# Patient Record
Sex: MALE | Race: WHITE | URBAN - METROPOLITAN AREA
[De-identification: names, ages, dates, MRNs, and addresses within clinical notes are randomized per-mention and may not be internally consistent; named-entity substitution may affect disease eponyms.]

---

## 2017-02-10 ENCOUNTER — OFFICE VISIT (OUTPATIENT)
Dept: INTERNAL MEDICINE CLINIC | Age: 22
End: 2017-02-10

## 2017-02-10 VITALS
OXYGEN SATURATION: 99 % | HEART RATE: 97 BPM | HEIGHT: 70 IN | TEMPERATURE: 98.8 F | RESPIRATION RATE: 20 BRPM | BODY MASS INDEX: 33.79 KG/M2 | SYSTOLIC BLOOD PRESSURE: 128 MMHG | DIASTOLIC BLOOD PRESSURE: 78 MMHG | WEIGHT: 236 LBS

## 2017-02-10 DIAGNOSIS — F98.8 ADD (ATTENTION DEFICIT DISORDER): ICD-10-CM

## 2017-02-10 DIAGNOSIS — J02.0 STREP THROAT: Primary | ICD-10-CM

## 2017-02-10 DIAGNOSIS — R50.9 FEVER AND CHILLS: ICD-10-CM

## 2017-02-10 DIAGNOSIS — R19.7 DIARRHEA, UNSPECIFIED TYPE: ICD-10-CM

## 2017-02-10 DIAGNOSIS — R59.0 ANTERIOR CERVICAL LYMPHADENOPATHY: ICD-10-CM

## 2017-02-10 DIAGNOSIS — R11.2 NON-INTRACTABLE VOMITING WITH NAUSEA, UNSPECIFIED VOMITING TYPE: ICD-10-CM

## 2017-02-10 RX ORDER — DEXTROAMPHETAMINE SACCHARATE, AMPHETAMINE ASPARTATE, DEXTROAMPHETAMINE SULFATE AND AMPHETAMINE SULFATE 2.5; 2.5; 2.5; 2.5 MG/1; MG/1; MG/1; MG/1
10 TABLET ORAL 2 TIMES DAILY
Qty: 60 TAB | Refills: 0 | Status: SHIPPED | OUTPATIENT
Start: 2017-02-10 | End: 2017-06-22 | Stop reason: SDUPTHER

## 2017-02-10 RX ORDER — AMOXICILLIN 500 MG/1
500 CAPSULE ORAL 2 TIMES DAILY
Qty: 20 CAP | Refills: 0 | Status: SHIPPED | OUTPATIENT
Start: 2017-02-10 | End: 2017-11-27 | Stop reason: ALTCHOICE

## 2017-02-10 RX ORDER — DEXTROAMPHETAMINE SACCHARATE, AMPHETAMINE ASPARTATE, DEXTROAMPHETAMINE SULFATE AND AMPHETAMINE SULFATE 2.5; 2.5; 2.5; 2.5 MG/1; MG/1; MG/1; MG/1
10 TABLET ORAL 2 TIMES DAILY
COMMUNITY
End: 2017-02-10 | Stop reason: SDUPTHER

## 2017-02-10 NOTE — PROGRESS NOTES
Chief Complaint   Patient presents with    Nausea     started wed,  vomited all yesterday, has been able to hold down fluid with a small amount of food last night.  Diarrhea     has subsidded today.  Headache     vague head pain along with gen weakness and pain. started wed.  Other     stated has \"bone pain\"     1. Have you been to the ER, urgent care clinic since your last visit? Hospitalized since your last visit? No    2. Have you seen or consulted any other health care providers outside of the [facility] since your last visit? Include any pap smears or colon screening.  No

## 2017-02-10 NOTE — MR AVS SNAPSHOT
Visit Information Date & Time Provider Department Dept. Phone Encounter #  
 2/10/2017  1:40 PM Tanya Bliss Internal Medicine [phone] [id number] Upcoming Health Maintenance Date Due DTaP/Tdap/Td series (1 - Tdap) 12/27/2014 INFLUENZA AGE 9 TO ADULT 8/1/2016 Allergies as of 2/10/2017  Review Complete On: 2/10/2017 By: Santana Epley, LPN No Known Allergies Current Immunizations  Never Reviewed No immunizations on file. Not reviewed this visit You Were Diagnosed With   
  
 Codes Comments Strep throat    -  Primary ICD-10-CM: J02.0 ICD-9-CM: 034.0 Fever and chills     ICD-10-CM: R50.9 ICD-9-CM: 780.60 Non-intractable vomiting with nausea, unspecified vomiting type     ICD-10-CM: R11.2 ICD-9-CM: 787.01 Diarrhea, unspecified type     ICD-10-CM: R19.7 ICD-9-CM: 787.91 Anterior cervical lymphadenopathy     ICD-10-CM: R59.0 ICD-9-CM: 785.6 ADD (attention deficit disorder)     ICD-10-CM: F98.8 ICD-9-CM: 314.00 Vitals BP Pulse Temp Resp Height(growth percentile) Weight(growth percentile) 128/78 97 98.8 °F (37.1 °C) (Oral) 20 5' 10\" (1.778 m) 236 lb (107 kg) SpO2 BMI Smoking Status 99% 33.86 kg/m2 Never Smoker BMI and BSA Data Body Mass Index Body Surface Area  
 33.86 kg/m 2 2.3 m 2 Preferred Pharmacy Pharmacy Name Phone [id number] [place] [address] [phone] Your Updated Medication List  
  
   
This list is accurate as of: 2/10/17 11:59 PM.  Always use your most recent med list.  
  
  
  
  
 AFRIN (OXYMETAZOLINE) 0.05 % nasal spray Generic drug:  oxymetazoline 2 Sprays two (2) times a day. albuterol 90 mcg/actuation inhaler Commonly known as:  PROVENTIL HFA, VENTOLIN HFA, PROAIR HFA Take 1 Puff by inhalation every four (4) hours as needed for Wheezing or Shortness of Breath. amoxicillin 500 mg capsule Commonly known as:  AMOXIL Take 1 Cap by mouth two (2) times a day. chlorpheniramine-HYDROcodone 10-8 mg/5 mL suspension Commonly known as:  Mary Favorite Take 5 mL by mouth every twelve (12) hours as needed for Cough. Max Daily Amount: 10 mL. dextroamphetamine-amphetamine 10 mg tablet Commonly known as:  ADDERALL Take 1 Tab (10 mg total) by mouth two (2) times a day. Max Daily Amount: 20 mg  
  
 fluticasone 50 mcg/actuation nasal spray Commonly known as:  Caffie Euler 2 Sprays by Both Nostrils route daily. naproxen 375 mg tablet Commonly known as:  NAPROSYN Take 1 Tab by mouth as needed. ondansetron hcl 4 mg tablet Commonly known as:  Glorianne Ou Take 1 Tab by mouth every eight (8) hours as needed for Nausea. SUMAtriptan 50 mg tablet Commonly known as:  IMITREX Take 1 Tab by mouth once as needed for Migraine for up to 1 dose. topiramate 50 mg tablet Commonly known as:  TOPAMAX Take 1 Tab by mouth two (2) times a day. Prescriptions Printed Refills  
 dextroamphetamine-amphetamine (ADDERALL) 10 mg tablet 0 Sig: Take 1 Tab (10 mg total) by mouth two (2) times a day. Max Daily Amount: 20 mg  
 Class: Print Route: Oral  
  
Prescriptions Sent to Pharmacy Refills  
 amoxicillin (AMOXIL) 500 mg capsule 0 Sig: Take 1 Cap by mouth two (2) times a day. Class: Normal  
 Pharmacy: [facility]/pharmacy #[id number] [address] #: [phone] Route: Oral  
  
Introducing [facility]! Laurie Phillips introduces [email or web address] patient portal. Now you can access parts of your medical record, email your doctor's office, and request medication refills online. 1. In your internet browser, go to [email or web address] 2. Click on the First Time User? Click Here link in the Sign In box. You will see the New Member Sign Up page. 3. Enter your FlightCar Access Code exactly as it appears below. You will not need to use this code after youve completed the sign-up process. If you do not sign up before the expiration date, you must request a new code. · FlightCar Access Code: [email or web address] Expires: 5/13/2017  9:22 AM 
 
4. Enter the last four digits of your Social Security Number (xxxx) and Date of Birth (mm/dd/yyyy) as indicated and click Submit. You will be taken to the next sign-up page. 5. Create a FlightCar ID. This will be your FlightCar login ID and cannot be changed, so think of one that is secure and easy to remember. 6. Create a FlightCar password. You can change your password at any time. 7. Enter your Password Reset Question and Answer. This can be used at a later time if you forget your password. 8. Enter your e-mail address. You will receive e-mail notification when new information is available in [address] 9. Click Sign Up. You can now view and download portions of your medical record. 10. Click the Download Summary menu link to download a portable copy of your medical information. If you have questions, please visit the Frequently Asked Questions section of the FlightCar website. Remember, FlightCar is NOT to be used for urgent needs. For medical emergencies, dial 911. Now available from your iPhone and Android! Please provide this summary of care documentation to your next provider. Your primary care clinician is listed as Raj De Jesus. If you have any questions after today's visit, please call [phone].

## 2017-02-12 NOTE — PROGRESS NOTES
HISTORY OF PRESENT ILLNESS  Jose Carlos Victoria is a 19 y.o. male here with cold symptoms/GI symptoms. 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 60 Tab 3    ondansetron hcl (ZOFRAN) 4 mg tablet Take 1 Tab by mouth every eight (8) hours as needed for Nausea. 30 Tab 2    SUMAtriptan (IMITREX) 50 mg tablet Take 1 Tab by mouth once as needed for Migraine for up to 1 dose. 30 Tab 2    naproxen (NAPROSYN) 375 mg tablet Take 1 Tab by mouth as needed. 60 Tab 2    fluticasone (FLONASE) 50 mcg/actuation nasal spray 2 Sprays by Both Nostrils route daily. 1 Bottle 0    chlorpheniramine-HYDROcodone (TUSSIONEX) 8-10 mg/5 mL suspension Take 5 mL by mouth every twelve (12) hours as needed for Cough. Max Daily Amount: 10 mL. 200 mL 0    albuterol (PROVENTIL HFA, VENTOLIN HFA, PROAIR HFA) 90 mcg/actuation inhaler Take 1 Puff by inhalation every four (4) hours as needed for Wheezing or Shortness of Breath. 1 Inhaler 0    oxymetazoline (AFRIN) 0.05 % nasal spray 2 Sprays two (2) times a day. No current facility-administered medications on file prior to visit. Past Medical History   Diagnosis Date    Headache     Headache(784.0)      History reviewed. No pertinent past surgical history. Social History     Social History    Marital status: SINGLE     Spouse name: N/A    Number of children: N/A    Years of education: N/A     Occupational History    Not on file.      Social History Main Topics    Smoking status: Never Smoker    Smokeless tobacco: Never Used    Alcohol use No    Drug use: No    Sexual activity: Not on file     Other Topics Concern    Not on file     Social History Narrative     Family History   Problem Relation Age of Onset    Diabetes Mother     No Known Problems Father     Diabetes Maternal Grandmother     Cancer Maternal Grandfather      This note will not be viewable in MyChart. If Narcotics or controlled substances were prescribed, I personally reviewed the patient  history. AKIL Sullivan is a 19 y.o. male who is here to talk about: dry cough, myalgias, headache, fever, chills and GI symptoms for 3 days ago, intermittently worsening since that time. He also reports nausea, vomiting and diarrhea which have resolved. He also has noted a \"scratchy\" throat. He denies a history of productive cough, generalized sinus pain, neither ear pain, white spots in throat, rhinorrhea, wheezing and SOB/ABRAHAM. Evaluation to date: none. Treatment to date: OTC products, which have been ineffective. Relevant PMH: No pertinent additional PMH. 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 Review of Systems   Constitutional: Positive for chills, fever and malaise/fatigue. HENT: Positive for congestion and sore throat (\"scratcy\"). Negative for ear pain. Eyes: Negative for discharge and redness. Respiratory: Positive for cough. Negative for sputum production, shortness of breath and wheezing. Cardiovascular: Negative for chest pain and palpitations. Gastrointestinal: Positive for abdominal pain, diarrhea, nausea and vomiting. Negative for constipation. GI symptoms have improved   Musculoskeletal: Positive for myalgias. Negative for back pain, joint pain and neck pain. Neurological: Negative for dizziness, tingling and headaches. Physical Exam   Constitutional: He is oriented to person, place, and time. Vital signs are normal. He appears well-developed. He is cooperative. No distress. Patient obese. HENT:   Right Ear: Tympanic membrane, external ear and ear canal normal. Tympanic membrane is not erythematous. No middle ear effusion. Left Ear: Tympanic membrane, external ear and ear canal normal. Tympanic membrane is not erythematous. No middle ear effusion. Nose: Nose normal. No mucosal edema or rhinorrhea. Right sinus exhibits no maxillary sinus tenderness and no frontal sinus tenderness. Left sinus exhibits no maxillary sinus tenderness and no frontal sinus tenderness. Mouth/Throat: Uvula is midline and mucous membranes are normal. Mucous membranes are not dry. Posterior oropharyngeal edema and posterior oropharyngeal erythema present. No oropharyngeal exudate or tonsillar abscesses. Eyes: Conjunctivae and EOM are normal. Pupils are equal, round, and reactive to light. Right eye exhibits no discharge. Left eye exhibits no discharge. Neck: Normal range of motion. Neck supple. Cardiovascular: Normal rate, regular rhythm and normal heart sounds. Pulmonary/Chest: Effort normal and breath sounds normal. No respiratory distress. Abdominal: Soft. Bowel sounds are normal. He exhibits no distension. There is no tenderness. There is no rigidity and no guarding. Lymphadenopathy:     He has cervical adenopathy. Neurological: He is alert and oriented to person, place, and time. Skin: Skin is warm and dry. He is not diaphoretic. Nursing note and vitals reviewed. ASSESSMENT and PLAN    ICD-10-CM ICD-9-CM    1. Strep throat J02.0 034.0 amoxicillin (AMOXIL) 500 mg capsule   2. Fever and chills R50.9 780.60    3. Non-intractable vomiting with nausea, unspecified vomiting type R11.2 787.01    4. Diarrhea, unspecified type R19.7 787.91    5. Anterior cervical lymphadenopathy R59.0 785.6    6. ADD (attention deficit disorder) F98.8 314.00 dextroamphetamine-amphetamine (ADDERALL) 10 mg tablet   STREP POSITIVE. Influenza negative. Amoxicillin prescribed.  Medication benefits, risks, indication, dosage, potential side effects and alternate medication options were discussed with patient who expressed understanding. Encouraged rest, increased fluids, Tylenol/Ibuprofen for pain/fever and warm salt-water gargles as tolerated. Return to office if no improvement in 4-5 days. Patient expressed understanding of instructions and agreement with treatment plan. Provided refill of Adderall as well. Medication benefits, risks, indication, dosage, potential side effects and alternate medication options were discussed with patient who expressed understanding. Disclaimer:  Advised patient to call back or return to office if symptoms worsen/change/persist.  Discussed expected course/resolution/complications of diagnosis in detail with patient.     Medication risks/benefits/costs/interactions/alternatives discussed with patient. Patient was provided an after visit summary which includes diagnoses, current medications, & vitals. Patient expressed understanding with the diagnosis and plan.

## 2017-02-20 ENCOUNTER — OFFICE VISIT (OUTPATIENT)
Dept: INTERNAL MEDICINE CLINIC | Age: 22
End: 2017-02-20

## 2017-02-20 VITALS
WEIGHT: 245.4 LBS | OXYGEN SATURATION: 98 % | RESPIRATION RATE: 16 BRPM | HEIGHT: 70 IN | BODY MASS INDEX: 35.13 KG/M2 | HEART RATE: 83 BPM | DIASTOLIC BLOOD PRESSURE: 72 MMHG | TEMPERATURE: 98.6 F | SYSTOLIC BLOOD PRESSURE: 98 MMHG

## 2017-02-20 DIAGNOSIS — R50.9 FEVER AND CHILLS: ICD-10-CM

## 2017-02-20 DIAGNOSIS — M79.10 MYALGIA: ICD-10-CM

## 2017-02-20 DIAGNOSIS — R59.0 ANTERIOR CERVICAL LYMPHADENOPATHY: ICD-10-CM

## 2017-02-20 DIAGNOSIS — J02.9 SORE THROAT: ICD-10-CM

## 2017-02-20 DIAGNOSIS — R05.9 COUGH: ICD-10-CM

## 2017-02-20 DIAGNOSIS — J02.0 STREP THROAT: Primary | ICD-10-CM

## 2017-02-20 LAB
S PYO AG THROAT QL: POSITIVE
VALID INTERNAL CONTROL?: YES

## 2017-02-20 RX ORDER — CLINDAMYCIN HYDROCHLORIDE 300 MG/1
300 CAPSULE ORAL 3 TIMES DAILY
Qty: 30 CAP | Refills: 0 | Status: SHIPPED | OUTPATIENT
Start: 2017-02-20 | End: 2017-03-02

## 2017-02-20 RX ORDER — CODEINE PHOSPHATE AND GUAIFENESIN 10; 100 MG/5ML; MG/5ML
5 SOLUTION ORAL
Qty: 118 ML | Refills: 0 | Status: SHIPPED | OUTPATIENT
Start: 2017-02-20 | End: 2017-11-27 | Stop reason: ALTCHOICE

## 2017-02-20 NOTE — MR AVS SNAPSHOT
Visit Information Date & Time Provider Department Dept. Phone Encounter #  
 2/20/2017  3:00 PM Anuja Chapman [address] Internal Medicine [phone] [id number] Upcoming Health Maintenance Date Due DTaP/Tdap/Td series (1 - Tdap) 12/27/2014 INFLUENZA AGE 9 TO ADULT 8/1/2016 Allergies as of 2/20/2017  Review Complete On: 2/20/2017 By: Anuja Chapman NP No Known Allergies Current Immunizations  Never Reviewed No immunizations on file. Not reviewed this visit You Were Diagnosed With   
  
 Codes Comments Strep throat    -  Primary ICD-10-CM: J02.0 ICD-9-CM: 034.0 Fever and chills     ICD-10-CM: R50.9 ICD-9-CM: 780.60 Myalgia     ICD-10-CM: M79.1 ICD-9-CM: 729.1 Anterior cervical lymphadenopathy     ICD-10-CM: R59.0 ICD-9-CM: 785.6 Sore throat     ICD-10-CM: J02.9 ICD-9-CM: 516 Cough     ICD-10-CM: R05 ICD-9-CM: 794. 2 Vitals BP Pulse Temp Resp Height(growth percentile) Weight(growth percentile) 98/72 (BP 1 Location: Left arm, BP Patient Position: Sitting) 83 98.6 °F (37 °C) (Oral) 16 5' 10\" (1.778 m) 245 lb 6.4 oz (111.3 kg) SpO2 BMI Smoking Status 98% 35.21 kg/m2 Never Smoker BMI and BSA Data Body Mass Index Body Surface Area  
 35.21 kg/m 2 2.34 m 2 Preferred Pharmacy Pharmacy Name Phone [id number] [address] [phone] Your Updated Medication List  
  
   
This list is accurate as of: 2/20/17  3:55 PM.  Always use your most recent med list.  
  
  
  
  
 AFRIN (OXYMETAZOLINE) 0.05 % nasal spray Generic drug:  oxymetazoline 2 Sprays two (2) times a day. albuterol 90 mcg/actuation inhaler Commonly known as:  PROVENTIL HFA, VENTOLIN HFA, PROAIR HFA Take 1 Puff by inhalation every four (4) hours as needed for Wheezing or Shortness of Breath. amoxicillin 500 mg capsule Commonly known as:  AMOXIL Take 1 Cap by mouth two (2) times a day. clindamycin 300 mg capsule Commonly known as:  CLEOCIN Take 1 Cap by mouth three (3) times daily for 10 days. dextroamphetamine-amphetamine 10 mg tablet Commonly known as:  ADDERALL Take 1 Tab (10 mg total) by mouth two (2) times a day. Max Daily Amount: 20 mg  
  
 fluticasone 50 mcg/actuation nasal spray Commonly known as:  Fernando Sacks 2 Sprays by Both Nostrils route daily. guaiFENesin-codeine 100-10 mg/5 mL solution Commonly known as:  ROBITUSSIN AC Take 5 mL by mouth three (3) times daily as needed for Cough. Max Daily Amount: 15 mL.  
  
 naproxen 375 mg tablet Commonly known as:  NAPROSYN Take 1 Tab by mouth as needed. ondansetron hcl 4 mg tablet Commonly known as:  Jud Footman Take 1 Tab by mouth every eight (8) hours as needed for Nausea. SUMAtriptan 50 mg tablet Commonly known as:  IMITREX Take 1 Tab by mouth once as needed for Migraine for up to 1 dose. topiramate 50 mg tablet Commonly known as:  TOPAMAX Take 1 Tab by mouth two (2) times a day. Prescriptions Printed Refills  
 guaiFENesin-codeine (ROBITUSSIN AC) 100-10 mg/5 mL solution 0 Sig: Take 5 mL by mouth three (3) times daily as needed for Cough. Max Daily Amount: 15 mL. Class: Print Route: Oral  
  
Prescriptions Sent to Pharmacy Refills  
 clindamycin (CLEOCIN) 300 mg capsule 0 Sig: Take 1 Cap by mouth three (3) times daily for 10 days. Class: Normal  
 Pharmacy: [facility]/pharmacy #[id number]- [place], [address] [place] [address] #: [phone] Route: Oral  
  
We Performed the Following AMB POC RAPID INFLUENZA TEST [65575 CPT(R)] AMB POC RAPID STREP A [79586 CPT(R)] UPPER RESPIRATORY CULTURE [id number] CPT(R)] Introducing [facility]!    
 [facility] introduces 2CRisk patient portal. Now you can access parts of your medical record, email your doctor's office, and request medication refills online. 1. In your internet browser, go to [email or web address] "[email or web address] 2. Click on the First Time User? Click Here link in the Sign In box. You will see the New Member Sign Up page. 3. Enter your PlotWatt Access Code exactly as it appears below. You will not need to use this code after youve completed the sign-up process. If you do not sign up before the expiration date, you must request a new code. · PlotWatt Access Code: [email or web address] Expires: 5/13/2017  9:22 AM 
 
4. Enter the last four digits of your Social Security Number (xxxx) and Date of Birth (mm/dd/yyyy) as indicated and click Submit. You will be taken to the next sign-up page. 5. Create a PlotWatt ID. This will be your PlotWatt login ID and cannot be changed, so think of one that is secure and easy to remember. 6. Create a PlotWatt password. You can change your password at any time. 7. Enter your Password Reset Question and Answer. This can be used at a later time if you forget your password. 8. Enter your e-mail address. You will receive e-mail notification when new information is available in [address] 9. Click Sign Up. You can now view and download portions of your medical record. 10. Click the Download Summary menu link to download a portable copy of your medical information. If you have questions, please visit the Frequently Asked Questions section of the PlotWatt website. Remember, PlotWatt is NOT to be used for urgent needs. For medical emergencies, dial 911. Now available from your iPhone and Android! Please provide this summary of care documentation to your next provider. Your primary care clinician is listed as Sarthak Miranda. If you have any questions after today's visit, please call [phone].

## 2017-02-20 NOTE — LETTER
NOTIFICATION RETURN TO WORK / SCHOOL 
 
2/20/2017 3:39 PM 
 
Mr. Kimberly Esquivel [address] [facility] [address] To Whom It May Concern: 
 
Kimberly Esquivel is currently under the care of [address] [place] [address]. He will return to work/school on: Thursday February 23, 2017 If there are questions or concerns please have the patient contact our office.  
 
 
 
Sincerely, 
 
 
Jas Frost NP

## 2017-02-20 NOTE — PROGRESS NOTES
Chief Complaint   Patient presents with    Cough     sore throat, and states that he didn't have strep last week but now he has a very sore throat.  saturday is when it started getting worse.  cold

## 2017-02-20 NOTE — PROGRESS NOTES
HISTORY OF PRESENT ILLNESS  Jose Carlos Victoria is a 19 y.o. male here with cough/sore throat. 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 20 Cap 0    dextroamphetamine-amphetamine (ADDERALL) 10 mg tablet Take 1 Tab (10 mg total) by mouth two (2) times a day. Max Daily Amount: 20 mg 60 Tab 0    topiramate (TOPAMAX) 50 mg tablet Take 1 Tab by mouth two (2) times a day. 60 Tab 3    ondansetron hcl (ZOFRAN) 4 mg tablet Take 1 Tab by mouth every eight (8) hours as needed for Nausea. 30 Tab 2    SUMAtriptan (IMITREX) 50 mg tablet Take 1 Tab by mouth once as needed for Migraine for up to 1 dose. 30 Tab 2    naproxen (NAPROSYN) 375 mg tablet Take 1 Tab by mouth as needed. 60 Tab 2    fluticasone (FLONASE) 50 mcg/actuation nasal spray 2 Sprays by Both Nostrils route daily. 1 Bottle 0    chlorpheniramine-HYDROcodone (TUSSIONEX) 8-10 mg/5 mL suspension Take 5 mL by mouth every twelve (12) hours as needed for Cough. Max Daily Amount: 10 mL. 200 mL 0    albuterol (PROVENTIL HFA, VENTOLIN HFA, PROAIR HFA) 90 mcg/actuation inhaler Take 1 Puff by inhalation every four (4) hours as needed for Wheezing or Shortness of Breath. 1 Inhaler 0    oxymetazoline (AFRIN) 0.05 % nasal spray 2 Sprays two (2) times a day. No current facility-administered medications on file prior to visit. Past Medical History   Diagnosis Date    Headache     Headache(784.0)      No past surgical history on file.   Social History     Social History    Marital status: SINGLE     Spouse name: N/A    Number of children: N/A    Years of education: N/A     Occupational History    Not on file. Social History Main Topics    Smoking status: Never Smoker    Smokeless tobacco: Never Used    Alcohol use No    Drug use: No    Sexual activity: Not on file     Other Topics Concern    Not on file     Social History Narrative     Family History   Problem Relation Age of Onset    Diabetes Mother     No Known Problems Father     Diabetes Maternal Grandmother     Cancer Maternal Grandfather      This note will not be viewable in [employer]. If Narcotics or controlled substances were prescribed, I personally reviewed the patient  history. AKIL José is a 19 y.o. male with history of STREP + who is here to talk about: sore throat, swollen glands, dry cough, myalgias, fever, chills and URI symptoms for 2 days ago, gradually worsening since that time. He also reports diagnosis of strep 2 weeks ago treated with Amoxicillin which he completed. He denies a history of generalized sinus pain, nausea/vomiting/diarrhea, rhinorrhea, wheezing and SOB/ABRAHAM. Evaluation to date: previously treated for +strep with Amoxicillin . Treatment to date: OTC products, which have been temporarily effective. Relevant PMH: No pertinent additional PMH. 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 Review of Systems   Constitutional: Positive for chills, fever and malaise/fatigue. HENT: Positive for congestion and sore throat. Negative for ear pain. Eyes: Negative for discharge and redness. Respiratory: Positive for cough. Negative for sputum production, shortness of breath and wheezing. Cardiovascular: Negative for chest pain and palpitations. Gastrointestinal: Negative for abdominal pain, nausea and vomiting. Musculoskeletal: Positive for myalgias. Neurological: Negative for dizziness, tingling and headaches.        Physical Exam   Constitutional: He is oriented to person, place, and time. He appears well-developed. He is cooperative. Non-toxic appearance. He does not have a sickly appearance. He appears ill. No distress. Patient hypotensive without symptoms. Patient obese. HENT:   Right Ear: Tympanic membrane, external ear and ear canal normal. Tympanic membrane is not erythematous. No middle ear effusion. Left Ear: Tympanic membrane, external ear and ear canal normal. Tympanic membrane is not erythematous. No middle ear effusion. Nose: Nose normal. No mucosal edema or rhinorrhea. Right sinus exhibits no maxillary sinus tenderness and no frontal sinus tenderness. Left sinus exhibits no maxillary sinus tenderness and no frontal sinus tenderness. Mouth/Throat: Uvula is midline and mucous membranes are normal. Mucous membranes are not dry. Posterior oropharyngeal edema and posterior oropharyngeal erythema present. No oropharyngeal exudate or tonsillar abscesses. Eyes: Conjunctivae and EOM are normal. Pupils are equal, round, and reactive to light. Right eye exhibits no discharge. Left eye exhibits no discharge. Neck: Normal range of motion. Neck supple. Cardiovascular: Normal rate, regular rhythm and normal heart sounds. Pulmonary/Chest: Effort normal and breath sounds normal. No respiratory distress. Lymphadenopathy:     He has cervical adenopathy. Neurological: He is alert and oriented to person, place, and time. Skin: Skin is warm and dry. He is not diaphoretic. Nursing note and vitals reviewed. ASSESSMENT and PLAN    ICD-10-CM ICD-9-CM    1. Strep throat J02.0 034.0 UPPER RESPIRATORY CULTURE      clindamycin (CLEOCIN) 300 mg capsule   2. Fever and chills R50.9 780.60 AMB POC RAPID STREP A      AMB POC RAPID INFLUENZA TEST      UPPER RESPIRATORY CULTURE   3. Myalgia M79.1 729.1 AMB POC RAPID STREP A      AMB POC RAPID INFLUENZA TEST      UPPER RESPIRATORY CULTURE   4.  Anterior cervical lymphadenopathy R59.0 785.6 AMB POC RAPID STREP A      AMB POC RAPID INFLUENZA TEST      UPPER RESPIRATORY CULTURE   5. Sore throat J02.9 462 AMB POC RAPID STREP A      AMB POC RAPID INFLUENZA TEST      UPPER RESPIRATORY CULTURE   6. Cough R05 786.2 AMB POC RAPID STREP A      AMB POC RAPID INFLUENZA TEST      UPPER RESPIRATORY CULTURE      guaiFENesin-codeine (ROBITUSSIN AC) 100-10 mg/5 mL solution   STREP POSITIVE. Influenza negative. Clindamycin and Robitussin AC prescribed. Medication benefits, risks, indication, dosage, potential side effects and alternate medication options were discussed with patient who expressed understanding. Encouraged rest, increased fluids, Tylenol/Ibuprofen for pain/fever and warm salt-water gargles as tolerated. Upper respiratory culture obtained. Will notify patient of results and adjust treatment plan as indicated. Return to office if no improvement in 4-5 days. Note provided to return to work in 3 days. Encouraged probiotics BID with this medication. Patient expressed understanding of instructions and agreement with treatment plan. Disclaimer:  Advised patient to call back or return to office if symptoms worsen/change/persist.  Discussed expected course/resolution/complications of diagnosis in detail with patient.     Medication risks/benefits/costs/interactions/alternatives discussed with patient. Patient was provided an after visit summary which includes diagnoses, current medications, & vitals. Patient expressed understanding with the diagnosis and plan.

## 2017-02-20 NOTE — PATIENT INSTRUCTIONS
Thank you for choosing [address] [facility]. We know you have many options when it comes to your healthcare; we appreciate that you picked us. Our goal is to provide exceptional  service and world class care for every patient. You may receive a survey in the mail or by email asking for your feedback. Please take a few minutes to share your thoughts about your recent visit. Your comments helps us understand what we do well and what we can do better. To ensure confidentiality, this survey is administered by an independent third-party, [address] [place] [address] participation will help us to improve the quality of care that we provide to you, your family, friends, and neighbors. Thank you! Cough: Care Instructions  Your Care Instructions  A cough is your body's response to something that bothers your throat or airways. Many things can cause a cough. You might cough because of a cold or the flu, bronchitis, or asthma. Smoking, postnasal drip, allergies, and stomach acid that backs up into your throat also can cause coughs. A cough is a symptom, not a disease. Most coughs stop when the cause, such as a cold, goes away. You can take a few steps at home to cough less and feel better. Follow-up care is a key part of your treatment and safety. Be sure to make and go to all appointments, and call your doctor if you are having problems. It's also a good idea to know your test results and keep a list of the medicines you take. How can you care for yourself at home? · Drink lots of water and other fluids. This helps thin the mucus and soothes a dry or sore throat. Honey or lemon juice in hot water or tea may ease a dry cough. · Take cough medicine as directed by your doctor. · Prop up your head on pillows to help you breathe and ease a dry cough. · Try cough drops to soothe a dry or sore throat. Cough drops don't stop a cough.  Medicine-flavored cough drops are no better than candy-flavored drops or hard candy. · Do not smoke. Avoid secondhand smoke. If you need help quitting, talk to your doctor about stop-smoking programs and medicines. These can increase your chances of quitting for good. When should you call for help? Call 911 anytime you think you may need emergency care. For example, call if:  · You have severe trouble breathing. Call your doctor now or seek immediate medical care if:  · You cough up blood. · You have new or worse trouble breathing. · You have a new or higher fever. · You have a new rash. Watch closely for changes in your health, and be sure to contact your doctor if:  · You cough more deeply or more often, especially if you notice more mucus or a change in the color of your mucus. · You have new symptoms, such as a sore throat, an earache, or sinus pain. · You do not get better as expected. Where can you learn more? Go to http://saida-huy.info/. Enter D279 in the search box to learn more about \"Cough: Care Instructions. \"  Current as of: May 27, 2016  Content Version: 11.1  © 1552-0648 [employer]. Care instructions adapted under license by [employer] (which disclaims liability or warranty for this information). If you have questions about a medical condition or this instruction, always ask your healthcare professional. [address] any warranty or liability for your use of this information. Learning About Fever  What is a fever? A fever is a high body temperature. It's one way your body fights being sick. A fever shows that the body is responding to infection or other illnesses, both minor and severe. A fever is a symptom, not an illness by itself. A fever can be a sign that you are ill, but most fevers are not caused by a serious problem. You may have a fever with a minor illness, such as a cold. But sometimes a very serious infection may cause little or no fever.  It is important to look at other symptoms, other conditions you have, and how you feel in general. In children, notice how they act and see what symptoms they complain of. What is a normal body temperature? A normal body temperature is about 98. 6ºF. Some people have a normal temperature that is a little higher or a little lower than this. Your temperature may be a little lower in the morning than it is later in the day. It may go up during hot weather or when you exercise, wear heavy clothes, or take a hot bath. Your temperature may also be different depending on how you take it. A temperature taken in the mouth (oral) or under the arm may be a little lower than your core temperature (rectal). What is a fever temperature? A core temperature of 100.4°F or above is considered a fever. What can cause a fever? A fever may be caused by:  · Infections. This is the most common cause of a fever. Examples of infections that can cause a fever include the flu, a kidney infection, or pneumonia. · Some medicines. · Severe trauma or injury, such as a heart attack, stroke, heatstroke, or burns. · Other medical conditions, such as arthritis and some cancers. How can you treat a fever at home? · Ask your doctor if you can take an over-the-counter pain medicine, such as acetaminophen (Tylenol), ibuprofen (Advil, Motrin), or naproxen (Aleve). Be safe with medicines. Read and follow all instructions on the label. · To prevent dehydration, drink plenty of fluids. Choose water and other caffeine-free clear liquids until you feel better. If you have kidney, heart, or liver disease and have to limit fluids, talk with your doctor before you increase the amount of fluids you drink. Follow-up care is a key part of your treatment and safety. Be sure to make and go to all appointments, and call your doctor if you are having problems. It's also a good idea to know your test results and keep a list of the medicines you take.   Where can you learn more?  Go to http://saida-huy.info/. Enter D533 in the search box to learn more about \"Learning About Fever. \"  Current as of: May 27, 2016  Content Version: 11.1  © 8801-0841 [employer] Care instructions adapted under license by [employer] (which disclaims liability or warranty for this information). If you have questions about a medical condition or this instruction, always ask your healthcare professional. [address] any warranty or liability for your use of this information. Swollen Lymph Nodes: Care Instructions  Your Care Instructions  Lymph nodes are small, bean-shaped glands throughout the body. They help your body fight germs and infections. Lymph nodes often swell when there is a problem such as an injury, infection, or tumor. · The nodes in your neck, under your chin, or behind your ears may swell when you have a cold or sore throat. · An injury or infection in a leg or foot can make the nodes in your groin swell. · Sometimes medicine can make lymph nodes swell, but this is rare. Treatment depends on what caused your nodes to swell. Usually the nodes return to normal size without a problem. Follow-up care is a key part of your treatment and safety. Be sure to make and go to all appointments, and call your doctor if you are having problems. Its also a good idea to know your test results and keep a list of the medicines you take. How can you care for yourself at home? · Take your medicines exactly as prescribed. Call your doctor if you think you are having a problem with your medicine. · Avoid irritation. ¨ Do not squeeze or pick at the lump. ¨ Do not stick a needle in it. · Prevent infection. Do not squeeze, drain, or puncture a painful lump. Doing this can irritate or inflame the lump, push any existing infection deeper into the skin, or cause severe bleeding.   · Get extra rest. Slow down just a little from your usual routine. · Drink plenty of fluids, enough so that your urine is light yellow or clear like water. If you have kidney, heart, or liver disease and have to limit fluids, talk with your doctor before you increase the amount of fluids you drink. · Take an over-the-counter pain medicine, such as acetaminophen (Tylenol), ibuprofen (Advil, Motrin), or naproxen (Aleve). Read and follow all instructions on the label. · Do not take two or more pain medicines at the same time unless the doctor told you to. Many pain medicines have acetaminophen, which is Tylenol. Too much acetaminophen (Tylenol) can be harmful. When should you call for help? Watch closely for changes in your health, and be sure to contact your doctor if:  · Your lymph nodes do not get smaller, or they get bigger. · The area becomes red and feels tender. · The nodes feel hard and do not move when you push on them. · You have a fever of 100° F.  · You have night sweats. · You lose weight without trying. Where can you learn more? Go to http://saida9Lenseshuy.info/. Enter Z381 in the search box to learn more about \"Swollen Lymph Nodes: Care Instructions. \"  Current as of: May 24, 2016  Content Version: 11.1  © 9756-5575 [employer]. Care instructions adapted under license by [employer] (which disclaims liability or warranty for this information). If you have questions about a medical condition or this instruction, always ask your healthcare professional. [place] [address] any warranty or liability for your use of this information. Muscle Aches: Care Instructions  Your Care Instructions  Muscle aches have many possible causes. Some common ones are overuse, tension, and injuries such as a strained muscle. An infection such as the flu can cause muscle aches. Or the aches may be caused by some medicines, such as statins.  Muscle aches may also be a symptom of a disease like lupus or fibromyalgia. Myalgia is the medical term for muscle aches. The doctor will do a physical exam and ask questions to try to find what is causing your pain. You may also have tests such as blood tests or imaging tests like X-rays. These can help find or rule out serious problems. The doctor has checked you carefully, but problems can develop later. If you notice any problems or new symptoms, get medical treatment right away. Follow-up care is a key part of your treatment and safety. Be sure to make and go to all appointments, and call your doctor if you are having problems. It's also a good idea to know your test results and keep a list of the medicines you take. How can you care for yourself at home? · Rest the area that hurts. You may need to stop or reduce the activity that causes your symptoms. Then you can return to it slowly. · Put ice or a cold pack on the area for 10 to 20 minutes at a time to ease pain. Put a thin cloth between the ice and your skin. · Take an over-the-counter pain medicine, such as acetaminophen (Tylenol), ibuprofen (Advil, Motrin), or naproxen (Aleve). Be safe with medicines. Read and follow all instructions on the label. When should you call for help? Call your doctor now or seek immediate medical care if:  · Your pain gets worse. · You have new symptoms, such as a fever, swelling, or a rash. Watch closely for changes in your health, and be sure to contact your doctor if:  · You do not get better as expected. Where can you learn more? Go to http://saida-huy.info/. Enter G355 in the search box to learn more about \"Muscle Aches: Care Instructions. \"  Current as of: February 19, 2016  Content Version: 11.1  © 8903-3993 [employer]. Care instructions adapted under license by [employer] (which disclaims liability or warranty for this information).  If you have questions about a medical condition or this instruction, always ask your healthcare professional. [place] [address] any warranty or liability for your use of this information. Sore Throat: Care Instructions  Your Care Instructions    Infection by bacteria or a virus causes most sore throats. Cigarette smoke, dry air, air pollution, allergies, and yelling can also cause a sore throat. Sore throats can be painful and annoying. Fortunately, most sore throats go away on their own. If you have a bacterial infection, your doctor may prescribe antibiotics. Follow-up care is a key part of your treatment and safety. Be sure to make and go to all appointments, and call your doctor if you are having problems. It's also a good idea to know your test results and keep a list of the medicines you take. How can you care for yourself at home? · If your doctor prescribed antibiotics, take them as directed. Do not stop taking them just because you feel better. You need to take the full course of antibiotics. · Gargle with warm salt water once an hour to help reduce swelling and relieve discomfort. Use 1 teaspoon of salt mixed in 1 cup of warm water. · Take an over-the-counter pain medicine, such as acetaminophen (Tylenol), ibuprofen (Advil, Motrin), or naproxen (Aleve). Read and follow all instructions on the label. · Be careful when taking over-the-counter cold or flu medicines and Tylenol at the same time. Many of these medicines have acetaminophen, which is Tylenol. Read the labels to make sure that you are not taking more than the recommended dose. Too much acetaminophen (Tylenol) can be harmful. · Drink plenty of fluids. Fluids may help soothe an irritated throat. Hot fluids, such as tea or soup, may help decrease throat pain. · Use over-the-counter throat lozenges to soothe pain. Regular cough drops or hard candy may also help. These should not be given to young children because of the risk of choking. · Do not smoke or allow others to smoke around you.  If you need help quitting, talk to your doctor about stop-smoking programs and medicines. These can increase your chances of quitting for good. · Use a vaporizer or humidifier to add moisture to your bedroom. Follow the directions for cleaning the machine. When should you call for help? Call your doctor now or seek immediate medical care if:  · You have new or worse trouble swallowing. · Your sore throat gets much worse on one side. Watch closely for changes in your health, and be sure to contact your doctor if you do not get better as expected. Where can you learn more? Go to http://saida-huy.info/. Enter [id number] in the search box to learn more about \"Sore Throat: Care Instructions. \"  Current as of: July 29, 2016  Content Version: 11.1  © 1004-6975 [employer]. Care instructions adapted under license by [employer] (which disclaims liability or warranty for this information). If you have questions about a medical condition or this instruction, always ask your healthcare professional. [place] [address] any warranty or liability for your use of this information. Rapid Strep Test: About This Test  What is it? A rapid strep test checks the bacteria in your throat to see if strep is the cause of your sore throat. Why is this test done? It may be done so your doctor can find out right away whether you have strep throat. There is another test for strep, called a throat culture, but that test takes a few days to get the results. How can you prepare for the test?  You don't need to do anything before you have this test.  What happens during the test?  · You will be asked to tilt your head back and open your mouth as wide as possible. · Your doctor will press your tongue down with a flat stick (tongue depressor) and then examine your mouth and throat.   · A clean cotton swab will be rubbed over the back of your throat, around your tonsils, and over any red areas or sores to collect a sample. How long does the test take? · The test takes less than a minute. · Results are available in 10 to 15 minutes. When should you call for help? Call your doctor now or seek immediate medical care if:  · Your pain gets worse on one side of your throat, or you have trouble opening your mouth. · You have a new or higher fever, or you have a fever with a stiff neck or severe headache. · Swallowing becomes harder, or you have any trouble breathing. · You are sensitive to light or feel very sleepy or confused. Watch closely for changes in your health, and be sure to contact your doctor if:  · You do not start to feel better after 2 days. Follow-up care is a key part of your treatment and safety. Be sure to make and go to all appointments, and call your doctor if you are having problems. It's also a good idea to keep a list of the medicines you take. Ask your doctor when you can expect to have your test results. Where can you learn more? Go to http://saida-huy.info/. Enter B356 in the search box to learn more about \"Rapid Strep Test: About This Test.\"  Current as of: July 29, 2016  Content Version: 11.1  © 2086-1672 [employer]. Care instructions adapted under license by [employer] (which disclaims liability or warranty for this information). If you have questions about a medical condition or this instruction, always ask your healthcare professional. [place] [address] any warranty or liability for your use of this information. Strep Throat: Care Instructions  Your Care Instructions    Strep throat is a bacterial infection that causes sudden, severe sore throat and fever. Strep throat, which is caused by bacteria called streptococcus, is treated with antibiotics. Sometimes a strep test is necessary to tell if the sore throat is caused by strep bacteria.  Treatment can help ease symptoms and may prevent future problems. Follow-up care is a key part of your treatment and safety. Be sure to make and go to all appointments, and call your doctor if you are having problems. It's also a good idea to know your test results and keep a list of the medicines you take. How can you care for yourself at home? · Take your antibiotics as directed. Do not stop taking them just because you feel better. You need to take the full course of antibiotics. · Strep throat can spread to others until 24 hours after you begin taking antibiotics. During this time, you should avoid contact with other people at work or home, especially infants and children. Do not sneeze or cough on others, and wash your hands often. Keep your drinking glass and eating utensils separate from those of others, and wash these items well in hot, soapy water. · Gargle with warm salt water at least once each hour to help reduce swelling and make your throat feel better. Use 1 teaspoon of salt mixed in 8 fluid ounces of warm water. · Take an over-the-counter pain medication, such as acetaminophen (Tylenol), ibuprofen (Advil, Motrin), or naproxen (Aleve). Read and follow all instructions on the label. · Try an over-the-counter anesthetic throat spray or throat lozenges, which may help relieve throat pain. · Drink plenty of fluids. Fluids may help soothe an irritated throat. Hot fluids, such as tea or soup, may help your throat feel better. · Eat soft solids and drink plenty of clear liquids. Flavored ice pops, ice cream, scrambled eggs, sherbet, and gelatin dessert (such as Jell-O) may also soothe the throat. · Get lots of rest.  · Do not smoke, and avoid secondhand smoke. If you need help quitting, talk to your doctor about stop-smoking programs and medicines. These can increase your chances of quitting for good. · Use a vaporizer or humidifier to add moisture to the air in your bedroom. Follow the directions for cleaning the machine.   When should you call for help?  Call your doctor now or seek immediate medical care if:  · You have a new or higher fever. · You have a fever with a stiff neck or severe headache. · You have new or worse trouble swallowing. · Your sore throat gets much worse on one side. · Your pain becomes much worse on one side of your throat. Watch closely for changes in your health, and be sure to contact your doctor if:  · You are not getting better after 2 days (48 hours). · You do not get better as expected. Where can you learn more? Go to http://saida-huy.info/. Enter K625 in the search box to learn more about \"Strep Throat: Care Instructions. \"  Current as of: July 29, 2016  Content Version: 11.1  © 1098-5250 [employer]. Care instructions adapted under license by [employer] (which disclaims liability or warranty for this information). If you have questions about a medical condition or this instruction, always ask your healthcare professional. [address] any warranty or liability for your use of this information.

## 2017-02-22 LAB — BACTERIA SPEC RESP CULT: NORMAL

## 2017-02-23 NOTE — PROGRESS NOTES
Spoke with mother, Derickan Kyung and informed of results and to continue on currant antibiotic. Received verbal understanding.

## 2017-02-23 NOTE — PROGRESS NOTES
Let him know that this is not showing any bacteria so he is on the right antibiotics. Continue those as planned.

## 2017-05-30 DIAGNOSIS — G43.709 CHRONIC MIGRAINE WITHOUT AURA WITHOUT STATUS MIGRAINOSUS, NOT INTRACTABLE: ICD-10-CM

## 2017-06-01 RX ORDER — TOPIRAMATE 50 MG/1
50 TABLET, FILM COATED ORAL 2 TIMES DAILY
Qty: 180 TAB | Refills: 1 | Status: SHIPPED | OUTPATIENT
Start: 2017-06-01 | End: 2017-06-03 | Stop reason: SDUPTHER

## 2017-06-03 DIAGNOSIS — G43.709 CHRONIC MIGRAINE WITHOUT AURA WITHOUT STATUS MIGRAINOSUS, NOT INTRACTABLE: ICD-10-CM

## 2017-06-05 RX ORDER — TOPIRAMATE 50 MG/1
TABLET, FILM COATED ORAL
Qty: 30 TAB | Refills: 0 | Status: SHIPPED | OUTPATIENT
Start: 2017-06-05 | End: 2017-08-03 | Stop reason: SDUPTHER

## 2017-06-22 DIAGNOSIS — F98.8 ADD (ATTENTION DEFICIT DISORDER): ICD-10-CM

## 2017-06-22 RX ORDER — DEXTROAMPHETAMINE SACCHARATE, AMPHETAMINE ASPARTATE, DEXTROAMPHETAMINE SULFATE AND AMPHETAMINE SULFATE 2.5; 2.5; 2.5; 2.5 MG/1; MG/1; MG/1; MG/1
10 TABLET ORAL 2 TIMES DAILY
Qty: 60 TAB | Refills: 0 | Status: SHIPPED | OUTPATIENT
Start: 2017-06-22 | End: 2017-08-03 | Stop reason: SDUPTHER

## 2017-08-03 ENCOUNTER — OFFICE VISIT (OUTPATIENT)
Dept: INTERNAL MEDICINE CLINIC | Age: 22
End: 2017-08-03

## 2017-08-03 VITALS
RESPIRATION RATE: 18 BRPM | OXYGEN SATURATION: 98 % | DIASTOLIC BLOOD PRESSURE: 78 MMHG | WEIGHT: 245 LBS | TEMPERATURE: 98 F | BODY MASS INDEX: 35.07 KG/M2 | HEIGHT: 70 IN | HEART RATE: 87 BPM | SYSTOLIC BLOOD PRESSURE: 122 MMHG

## 2017-08-03 DIAGNOSIS — J30.89 ENVIRONMENTAL AND SEASONAL ALLERGIES: ICD-10-CM

## 2017-08-03 DIAGNOSIS — F98.8 ADD (ATTENTION DEFICIT DISORDER): Primary | ICD-10-CM

## 2017-08-03 DIAGNOSIS — Z91.013 SEAFOOD ALLERGY: ICD-10-CM

## 2017-08-03 DIAGNOSIS — G43.009 MIGRAINE WITHOUT AURA AND WITHOUT STATUS MIGRAINOSUS, NOT INTRACTABLE: ICD-10-CM

## 2017-08-03 RX ORDER — DEXTROAMPHETAMINE SACCHARATE, AMPHETAMINE ASPARTATE, DEXTROAMPHETAMINE SULFATE AND AMPHETAMINE SULFATE 2.5; 2.5; 2.5; 2.5 MG/1; MG/1; MG/1; MG/1
10 TABLET ORAL 2 TIMES DAILY
Qty: 60 TAB | Refills: 0 | Status: SHIPPED | OUTPATIENT
Start: 2017-09-02 | End: 2017-10-02

## 2017-08-03 RX ORDER — DEXTROAMPHETAMINE SACCHARATE, AMPHETAMINE ASPARTATE, DEXTROAMPHETAMINE SULFATE AND AMPHETAMINE SULFATE 2.5; 2.5; 2.5; 2.5 MG/1; MG/1; MG/1; MG/1
10 TABLET ORAL 2 TIMES DAILY
Qty: 60 TAB | Refills: 0 | Status: SHIPPED | OUTPATIENT
Start: 2017-08-03 | End: 2017-09-02

## 2017-08-03 RX ORDER — ONDANSETRON 4 MG/1
4 TABLET, FILM COATED ORAL
Qty: 30 TAB | Refills: 3 | Status: SHIPPED | OUTPATIENT
Start: 2017-08-03 | End: 2017-11-27 | Stop reason: ALTCHOICE

## 2017-08-03 RX ORDER — SUMATRIPTAN 50 MG/1
50 TABLET, FILM COATED ORAL
Qty: 30 TAB | Refills: 3 | Status: SHIPPED | OUTPATIENT
Start: 2017-08-03

## 2017-08-03 RX ORDER — TOPIRAMATE 50 MG/1
50 TABLET, FILM COATED ORAL 2 TIMES DAILY
Qty: 60 TAB | Refills: 3 | Status: SHIPPED | OUTPATIENT
Start: 2017-08-03 | End: 2017-11-10 | Stop reason: SDUPTHER

## 2017-08-03 RX ORDER — DEXTROAMPHETAMINE SACCHARATE, AMPHETAMINE ASPARTATE, DEXTROAMPHETAMINE SULFATE AND AMPHETAMINE SULFATE 2.5; 2.5; 2.5; 2.5 MG/1; MG/1; MG/1; MG/1
10 TABLET ORAL 2 TIMES DAILY
Qty: 60 TAB | Refills: 0 | Status: SHIPPED | OUTPATIENT
Start: 2017-10-02 | End: 2017-11-01

## 2017-08-03 NOTE — LETTER
8/8/2017 3:59 PM 
 
Mr. Jackie Corona [address] [facility] [id number] Dear Jackie Corona: 
 
Please find your most recent results below. Resulted Orders ALLERGEN (24) PANEL Result Value Ref Range CLASS DESCRIPTION Comment Comment:  
       Levels of Specific IgE       Class  Description of Class 
    ---------------------------  -----  -------------------- 
                   < 0.10         0         Negative 0.10 -    0.31         0/I       Equivocal/Low 
           0.32 -    0.55         I         Low 
           0.56 -    1.40         II        Moderate 1.41 -    3.90         III       High 
           3.91 -   19.00         IV        Very High 
          19.01 -  100.00         V         Very High 
                  >100.00         VI        Very High Immunoglobulin E 153 (H) 0 - 100 IU/mL D. pteronyssinus 1.95 (A) Class III kU/L  
 D. farinae Mite 1.21 (A) Class II kU/L Cat Hair/Dander 1.06 (A) Class II kU/L Dog Hair/Dander 0.31 (A) Class 0/I kU/L [place] Grass <0.10 Class 0 kU/L [place] grass 0.28 (A) Class 0/I kU/L Som Grass <0.10 Class 0 kU/L Cockroach, Danish 0.18 (A) Class 0/I kU/L Penicillium notatum <0.10 Class 0 kU/L Cladosporium herbarum <0.10 Class 0 kU/L Aspergillus fumigatus <0.10 Class 0 kU/L Alternaria tenuis <0.10 Class 0 kU/L Maple/Bland <0.10 Class 0 kU/L  
 [id number] COMMON SILVER BIRCH 0.13 (A) Class 0/I kU/L [facility] 0.49 (A) Class I kU/L [place] 0.74 (A) Class II kU/L [employer] <0.10 Class 0 kU/L [place] Tree <0.10 Class 0 kU/L Pecan Tree <0.10 Class 0 kU/L [place] <0.10 Class 0 kU/L Ragweed, Short/Common <0.10 Class 0 kU/L Pigweed, Rough <0.10 Class 0 kU/L Sheep [facility]) <0.10 Class 0 kU/L Mouse urine <0.10 Class 0 kU/L Narrative Performed at:  [address]  [id number] : Jamaal Genao MD, Phone:  [phone] FOOD ALLERGY PROFILE Result Value Ref Range Egg White <0.10 Class 0 kU/L Peanut <0.10 Class 0 kU/L Soybean <0.10 Class 0 kU/L Milk (Cow) 0.18 (A) Class 0/I kU/L Clam <0.10 Class 0 kU/L Shrimp 1.59 (A) Class III kU/L Walnuts, IgE <0.10 Class 0 kU/L Codfish <0.10 Class 0 kU/L Scallops <0.10 Class 0 kU/L Wheat <0.10 Class 0 kU/L Corn <0.10 Class 0 kU/L Sesame Seed <0.10 Class 0 kU/L Narrative Performed at:  [address]  [id number] : Jamaal Genao MD, Phone:  [phone] ALLERGEN PROFILE, SHELLFISH Result Value Ref Range Crab <0.10 Class 0 kU/L Oyster <0.10 Class 0 kU/L Lobster <0.10 Class 0 kU/L Narrative Performed at:  [address]  [id number] : Jamaal Genao MD, Phone:  [phone] CBC WITH AUTOMATED DIFF Result Value Ref Range WBC 7.0 3.4 - 10.8 x10E3/uL  
 RBC 5.38 4.14 - 5.80 x10E6/uL HGB 15.2 12.6 - 17.7 g/dL HCT 46.8 37.5 - 51.0 % MCV 87 79 - 97 fL  
 MCH 28.3 26.6 - 33.0 pg  
 MCHC 32.5 31.5 - 35.7 g/dL  
 RDW 14.5 12.3 - 15.4 % PLATELET 260 632 - 729 x10E3/uL NEUTROPHILS 59 % Lymphocytes 28 % MONOCYTES 11 % EOSINOPHILS 2 % BASOPHILS 0 %  
 ABS. NEUTROPHILS 4.1 1.4 - 7.0 x10E3/uL Abs Lymphocytes 2.0 0.7 - 3.1 x10E3/uL  
 ABS. MONOCYTES 0.7 0.1 - 0.9 x10E3/uL  
 ABS. EOSINOPHILS 0.1 0.0 - 0.4 x10E3/uL  
 ABS. BASOPHILS 0.0 0.0 - 0.2 x10E3/uL IMMATURE GRANULOCYTES 0 %  
 ABS. IMM. GRANS. 0.0 0.0 - 0.1 x10E3/uL Narrative Performed at:  [address]  [id number] : Jamaal Genao MD, Phone:  [phone] METABOLIC PANEL, COMPREHENSIVE Result Value Ref Range Glucose 74 65 - 99 mg/dL BUN 17 6 - 20 mg/dL Creatinine 1.00 0.76 - 1.27 mg/dL GFR est non- >59 mL/min/1.73 GFR est  >59 mL/min/1.73  
 BUN/Creatinine ratio 17 9 - 20 Sodium 139 134 - 144 mmol/L Potassium 3.7 3.5 - 5.2 mmol/L Chloride 99 96 - 106 mmol/L  
 CO2 21 18 - 29 mmol/L Calcium 9.9 8.7 - 10.2 mg/dL Protein, total 7.8 6.0 - 8.5 g/dL Albumin 4.4 3.5 - 5.5 g/dL GLOBULIN, TOTAL 3.4 1.5 - 4.5 g/dL A-G Ratio 1.3 1.2 - 2.2 Bilirubin, total 0.5 0.0 - 1.2 mg/dL Alk. phosphatase 89 39 - 117 IU/L  
 AST (SGOT) 16 0 - 40 IU/L  
 ALT (SGPT) 10 0 - 44 IU/L Narrative Performed at:  [address]  [id number] : Hussain Mayorga MD, Phone:  [phone] RECOMMENDATIONS: 
There are several allergens present ranging from moderate and a couple of high (including dust mites and shrimp). He does have reaction to shrimp which is moderate so avoidance would be urged. He also has several mild ones that I would like him to take into account and take Zyrtec or allegra when items are blooming ( spring and fall). We added the other testiPlease call me if you have any questions: [phone] Sincerely, 
 
 
Leigh Akins NP

## 2017-08-03 NOTE — PROGRESS NOTES
HISTORY OF PRESENT ILLNESS  Faraz Dowell is a 19 y.o. male here for adderall refill. 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 Max Daily Amount: 15 mL. 118 mL 0    amoxicillin (AMOXIL) 500 mg capsule Take 1 Cap by mouth two (2) times a day. 20 Cap 0    naproxen (NAPROSYN) 375 mg tablet Take 1 Tab by mouth as needed. 60 Tab 2    fluticasone (FLONASE) 50 mcg/actuation nasal spray 2 Sprays by Both Nostrils route daily. 1 Bottle 0    albuterol (PROVENTIL HFA, VENTOLIN HFA, PROAIR HFA) 90 mcg/actuation inhaler Take 1 Puff by inhalation every four (4) hours as needed for Wheezing or Shortness of Breath. 1 Inhaler 0    oxymetazoline (AFRIN) 0.05 % nasal spray 2 Sprays two (2) times a day. No current facility-administered medications on file prior to visit. Past Medical History:   Diagnosis Date    Headache     Headache(784.0)      No past surgical history on file. Social History     Social History    Marital status: SINGLE     Spouse name: N/A    Number of children: N/A    Years of education: N/A     Occupational History    Not on file.      Social History Main Topics    Smoking status: Never Smoker    Smokeless tobacco: Never Used    Alcohol use No    Drug use: No    Sexual activity: Not on file     Other Topics Concern    Not on file     Social History Narrative     Family History   Problem Relation Age of Onset    Diabetes Mother     No Known Problems Father    [facility] Diabetes Maternal Grandmother     Cancer Maternal Grandfather      This note will not be viewable in [address] If Narcotics or controlled substances were prescribed, I personally reviewed the patient  history. AKIL Bloom is a 19 y.o. male who is here for 3 month adderall refill. Denies insomnia, weight loss, chest pain, shortness of breath, palpitations, dizziness or headaches. Patient also reports doing well on the current dose. Denies any additional concerns or symptoms at this time. Would like to continue on this current treatment plan. He also is requesting allergen testing. He has an allergy to shellfish be believes and he would like testing as he is having different reactions to multiple foods as well. Review of Systems   Constitutional: Negative for chills, diaphoresis, fever and malaise/fatigue. HENT: Negative for congestion, ear pain and sore throat. Eyes: Negative for blurred vision, double vision, discharge and redness. Respiratory: Negative for cough and shortness of breath. Cardiovascular: Negative for chest pain and palpitations. Gastrointestinal: Negative for abdominal pain, constipation, diarrhea, nausea and vomiting. Musculoskeletal: Negative for back pain, myalgias and neck pain. Skin: Negative for rash. Neurological: Negative for dizziness, tingling and headaches. Psychiatric/Behavioral: Negative for depression. The patient is not nervous/anxious. Physical Exam   Constitutional: He is oriented to person, place, and time. Vital signs are normal. He appears well-developed. He is cooperative. He does not appear ill. No distress. Patient is obese. Neck: Normal range of motion. Neck supple. Cardiovascular: Normal rate, regular rhythm and normal heart sounds. Pulmonary/Chest: Effort normal and breath sounds normal. No respiratory distress. Musculoskeletal: He exhibits no edema.    Neurological: He is alert and oriented to person, place, and time.   Skin: Skin is warm and dry. He is not diaphoretic. Psychiatric: He has a normal mood and affect. His behavior is normal.   Nursing note and vitals reviewed. ASSESSMENT and PLAN    ICD-10-CM ICD-9-CM    1. ADD (attention deficit disorder) F98.8 314.00 dextroamphetamine-amphetamine (ADDERALL) 10 mg tablet      dextroamphetamine-amphetamine (ADDERALL) 10 mg tablet      dextroamphetamine-amphetamine (ADDERALL) 10 mg tablet   2. Migraine without aura and without status migrainosus, not intractable G43.009 346.10 SUMAtriptan (IMITREX) 50 mg tablet      ondansetron hcl (ZOFRAN) 4 mg tablet      topiramate (TOPAMAX) 50 mg tablet      CBC WITH AUTOMATED DIFF      METABOLIC PANEL, COMPREHENSIVE   3. Seafood allergy Z91.013 V15.04 ALLERGEN (24) PANEL      FOOD ALLERGY PROFILE      ALLERGEN PROFILE, SHELLFISH      CBC WITH AUTOMATED DIFF      METABOLIC PANEL, COMPREHENSIVE   4. Environmental and seasonal allergies J30.89 477.8 ALLERGEN (24) PANEL   Refill of adderall provided for 3 months (post-dated) along with multiple medication refills. Medication benefits, risks, indication, dosage, potential side effects and alternate medication options were discussed with patient who expressed understanding. Allergen profiles and basic labs obtained since it has been a year since last drawn. Will notify patient of results and adjust treatment plan as indicated. Encouraged patient to continue current treatment plan as he has been doing well with this. Follow up in 3 months for routine visit and refills. Return to office sooner if needed for new concerns or symptoms. Patient expressed understanding of instructions and agreement with treatment plan.

## 2017-08-03 NOTE — PATIENT INSTRUCTIONS
Thank you for choosing [facility]. We know you have many options when it comes to your healthcare; we appreciate that you picked us. Our goal is to provide exceptional  service and world class care for every patient. You may receive a survey in the mail or by email asking for your feedback. Please take a few minutes to share your thoughts about your recent visit. Your comments helps us understand what we do well and what we can do better. To ensure confidentiality, this survey is administered by an independent third-party, [address] [place] [address] participation will help us to improve the quality of care that we provide to you, your family, friends, and neighbors. Thank you! Allergies: Care Instructions  Your Care Instructions  Allergies occur when your body's defense system (immune system) overreacts to certain substances. The immune system treats a harmless substance as if it were a harmful germ or virus. Many things can cause this overreaction, including pollens, medicine, food, dust, animal dander, and mold. Allergies can be mild or severe. Mild allergies can be managed with home treatment. But medicine may be needed to prevent problems. Managing your allergies is an important part of staying healthy. Your doctor may suggest that you have allergy testing to help find out what is causing your allergies. When you know what things trigger your symptoms, you can avoid them. This can prevent allergy symptoms and other health problems. For severe allergies that cause reactions that affect your whole body (anaphylactic reactions), your doctor may prescribe a shot of epinephrine to carry with you in case you have a severe reaction. Learn how to give yourself the shot and keep it with you at all times. Make sure it is not . Follow-up care is a key part of your treatment and safety.  Be sure to make and go to all appointments, and call your doctor if you are having problems. It's also a good idea to know your test results and keep a list of the medicines you take. How can you care for yourself at home? · If you have been told by your doctor that dust or dust mites are causing your allergy, decrease the dust around your bed:  [facility] sheets, pillowcases, and other bedding in hot water every week. ¨ Use dust-proof covers for pillows, duvets, and mattresses. Avoid plastic covers because they tear easily and do not \"breathe. \" Wash as instructed on the label. ¨ Do not use any blankets and pillows that you do not need. ¨ Use blankets that you can wash in your washing machine. ¨ Consider removing drapes and carpets, which attract and hold dust, from your bedroom. · If you are allergic to house dust and mites, do not use home humidifiers. Your doctor can suggest ways you can control dust and mites. · Look for signs of cockroaches. Cockroaches cause allergic reactions. Use cockroach baits to get rid of them. Then, clean your home well. Cockroaches like areas where grocery bags, newspapers, empty bottles, or cardboard boxes are stored. Do not keep these inside your home, and keep trash and food containers sealed. Seal off any spots where cockroaches might enter your home. · If you are allergic to mold, get rid of furniture, rugs, and drapes that smell musty. Check for mold in the bathroom. · If you are allergic to outdoor pollen or mold spores, use air-conditioning. Change or clean all filters every month. Keep windows closed. · If you are allergic to pollen, stay inside when pollen counts are high. Use a vacuum  with a HEPA filter or a double-thickness filter at least two times each week. · Stay inside when air pollution is bad. Avoid paint fumes, perfumes, and other strong odors. · Avoid conditions that make your allergies worse. Stay away from smoke. Do not smoke or let anyone else smoke in your house. Do not use fireplaces or wood-burning stoves.   · If you are allergic to your pets, change the air filter in your furnace every month. Use high-efficiency filters. · If you are allergic to pet dander, keep pets outside or out of your bedroom. Old carpet and cloth furniture can hold a lot of animal dander. You may need to replace them. When should you call for help? Give an epinephrine shot if:  · You think you are having a severe allergic reaction. · You have symptoms in more than one body area, such as mild nausea and an itchy mouth. After giving an epinephrine shot call 911, even if you feel better. Call 911 if:  · You have symptoms of a severe allergic reaction. These may include:  ¨ Sudden raised, red areas (hives) all over your body. ¨ Swelling of the throat, mouth, lips, or tongue. ¨ Trouble breathing. ¨ Passing out (losing consciousness). Or you may feel very lightheaded or suddenly feel weak, confused, or restless. · You have been given an epinephrine shot, even if you feel better. Call your doctor now or seek immediate medical care if:  · You have symptoms of an allergic reaction, such as:  ¨ A rash or hives (raised, red areas on the skin). ¨ Itching. ¨ Swelling. ¨ Belly pain, nausea, or vomiting. Watch closely for changes in your health, and be sure to contact your doctor if:  · You do not get better as expected. Where can you learn more? Go to http://saida-huy.info/. Enter R642 in the search box to learn more about \"Allergies: Care Instructions. \"  Current as of: April 3, 2017  Content Version: 11.3  © 4517-6252 [employer]. Care instructions adapted under license by [employer] (which disclaims liability or warranty for this information). If you have questions about a medical condition or this instruction, always ask your healthcare professional. [address] any warranty or liability for your use of this information.        Migraine Headache: Care Instructions  Your Care Instructions  Migraines are painful, throbbing headaches that often start on one side of the head. They may cause nausea and vomiting and make you sensitive to light, sound, or smell. Without treatment, migraines can last from 4 hours to a few days. Medicines can help prevent migraines or stop them after they have started. Your doctor can help you find which ones work best for you. Follow-up care is a key part of your treatment and safety. Be sure to make and go to all appointments, and call your doctor if you are having problems. It's also a good idea to know your test results and keep a list of the medicines you take. How can you care for yourself at home? · Do not drive if you have taken a prescription pain medicine. · Rest in a quiet, dark room until your headache is gone. Close your eyes, and try to relax or go to sleep. Don't watch TV or read. · Put a cold, moist cloth or cold pack on the painful area for 10 to 20 minutes at a time. Put a thin cloth between the cold pack and your skin. · Use a warm, moist towel or a heating pad set on low to relax tight shoulder and neck muscles. · Have someone gently massage your neck and shoulders. · Take your medicines exactly as prescribed. Call your doctor if you think you are having a problem with your medicine. You will get more details on the specific medicines your doctor prescribes. · Be careful not to take pain medicine more often than the instructions allow. You could get worse or more frequent headaches when the medicine wears off. To prevent migraines  · Keep a headache diary so you can figure out what triggers your headaches. Avoiding triggers may help you prevent headaches. Record when each headache began, how long it lasted, and what the pain was like. (Was it throbbing, aching, stabbing, or dull?) Write down any other symptoms you had with the headache, such as nausea, flashing lights or dark spots, or sensitivity to bright light or loud noise. Note if the headache occurred near your period. List anything that might have triggered the headache. Triggers may include certain foods (chocolate, cheese, wine) or odors, smoke, bright light, stress, or lack of sleep. · If your doctor has prescribed medicine for your migraines, take it as directed. You may have medicine that you take only when you get a migraine and medicine that you take all the time to help prevent migraines. ¨ If your doctor has prescribed medicine for when you get a headache, take it at the first sign of a migraine, unless your doctor has given you other instructions. ¨ If your doctor has prescribed medicine to prevent migraines, take it exactly as prescribed. Call your doctor if you think you are having a problem with your medicine. · Find healthy ways to deal with stress. Migraines are most common during or right after stressful times. Take time to relax before and after you do something that has caused a migraine in the past.  · Try to keep your muscles relaxed by keeping good posture. Check your jaw, face, neck, and shoulder muscles for tension. Try to relax them. When you sit at a desk, change positions often. And make sure to stretch for 30 seconds each hour. · Get plenty of sleep and exercise. · Eat meals on a regular schedule. Avoid foods and drinks that often trigger migraines. These include chocolate, alcohol (especially red wine and port), aspartame, monosodium glutamate (MSG), and some additives found in foods (such as hot dogs, fournier, cold cuts, aged cheeses, and pickled foods). · Limit caffeine. Don't drink too much coffee, tea, or soda. But don't quit caffeine suddenly. That can also give you migraines. · Do not smoke or allow others to smoke around you. If you need help quitting, talk to your doctor about stop-smoking programs and medicines. These can increase your chances of quitting for good.   · If you are taking birth control pills or hormone therapy, talk to your doctor about whether they are triggering your migraines. When should you call for help? Call 911 anytime you think you may need emergency care. For example, call if:  · You have signs of a stroke. These may include:  ¨ Sudden numbness, paralysis, or weakness in your face, arm, or leg, especially on only one side of your body. ¨ Sudden vision changes. ¨ Sudden trouble speaking. ¨ Sudden confusion or trouble understanding simple statements. ¨ Sudden problems with walking or balance. ¨ A sudden, severe headache that is different from past headaches. Call your doctor now or seek immediate medical care if:  · You have new or worse nausea and vomiting. · You have a new or higher fever. · Your headache gets much worse. Watch closely for changes in your health, and be sure to contact your doctor if:  · You are not getting better after 2 days (48 hours). Where can you learn more? Go to http://saidaMobcarthuy.info/. Enter H877 in the search box to learn more about \"Migraine Headache: Care Instructions. \"  Current as of: October 14, 2016  Content Version: 11.3  © 6980-7760 [employer]. Care instructions adapted under license by [employer] (which disclaims liability or warranty for this information). If you have questions about a medical condition or this instruction, always ask your healthcare professional. [place] [address] any warranty or liability for your use of this information. Attention Deficit Hyperactivity Disorder (ADHD) in Adults: Care Instructions  Your Care Instructions  Attention deficit hyperactivity disorder, or ADHD, is a condition that makes it hard to pay attention. So you may have problems when you try to focus, get organized, and finish tasks. It might make you more active than other people. Or you might do things without thinking first.  ADHD is very common. It usually starts in early childhood.  Many adults don't realize they have it until their children are diagnosed. Then they become aware of their own symptoms. Doctors don't know what causes ADHD. But it often runs in families. ADHD can be treated with medicines, behavior training, and counseling. Treatment can improve your life. Follow-up care is a key part of your treatment and safety. Be sure to make and go to all appointments, and call your doctor if you are having problems. It's also a good idea to know your test results and keep a list of the medicines you take. How can you care for yourself at home? · Learn all you can about ADHD. This will help you and your family understand it better. · Take your medicines exactly as prescribed. Call your doctor if you think you are having a problem with your medicine. You will get more details on the specific medicines your doctor prescribes. · If you miss a dose of your medicine, do not take an extra dose. · If your doctor suggests counseling, find a counselor you like and trust. Talk openly and honestly. Be willing to make some changes. · Find a support group for adults with ADHD. Talking to others with the same problems can help you feel better. It can also give you ideas about how to best cope with the condition. · Get rid of distractions at your work space. Keep your desk clean. Try not to face a window or busy hallway. · Use files, planners, and other tools to keep you organized. · Limit use of alcohol, and do not use illegal drugs. People with ADHD tend to become addicted more easily than others. Tell your doctor if you need help to quit. Counseling, support groups, and sometimes medicines can help you stay free of alcohol or drugs. · Get at least 30 minutes of physical activity on most days of the week. Exercise has been shown to help people cope with ADHD. Walking is a good choice. You also may want to do other activities, such as running, swimming, cycling, or playing tennis or team sports.   When should you call for help?  Watch closely for changes in your health, and be sure to contact your doctor if:  · You feel sad a lot or cry all the time. · You have trouble sleeping, or you sleep too much. · You find it hard to concentrate, make decisions, or remember things. · You change how you normally eat. · You feel guilty for no reason. Where can you learn more? Go to http://saida-huy.info/. Enter B196 in the search box to learn more about \"Attention Deficit Hyperactivity Disorder (ADHD) in Adults: Care Instructions. \"  Current as of: July 26, 2016  Content Version: 11.3  © 2301-7087 [employer]. Care instructions adapted under license by [employer] (which disclaims liability or warranty for this information). If you have questions about a medical condition or this instruction, always ask your healthcare professional. [place] [address] any warranty or liability for your use of this information. Learning About Attention Deficit Hyperactivity Disorder (ADHD) in Adults  What is ADHD? Attention deficit hyperactivity disorder (ADHD) is a condition in which people have a hard time paying attention. Adults with ADHD also may be more active than normal. They tend to act without thinking. ADHD may make it harder for them to focus, get organized, and finish tasks. ADHD most often starts in childhood and lasts into adulthood. Many adults don't know that they have ADHD until their children are diagnosed. Then they begin to see their own symptoms. Doctors don't know what causes ADHD. But it tends to run in families. What are the symptoms? The most common types of ADHD symptoms in adults are attention problems and hyperactivity. Attention problems  Adults with ADHD often find it hard to:  · Finish tasks that don't interest them or aren't easy. But they may become obsessed with activities that they find interesting and enjoy. · Keep relationships.   · Focus their attention on conversations, reading materials, or jobs. They may change jobs a lot. · Remember things. They may misplace or lose things. · Pay attention. They are easily distracted. They find it hard to focus on one task. · Think before they act. They may make quick decisions. They may act before they think about the effect of their actions. Hyperactivity  Adults with ADHD may:  · Fidget. They may swing their legs, shift in their seats, or tap their fingers. · Move around a lot. They may feel \"revved up\" or on the go. They may not be able to slow down until they are very tired. · Find it hard to relax. They may feel restless and find it hard to do quiet things like read or watch TV. How does ADHD affect daily life? ADHD in adults may affect:  · Job performance. They may find it hard to organize their work, manage their time, and focus on one task at a time. They may forget, misplace, or lose things. They may quit their jobs out of boredom. · Relationships. Adults with ADHD may find it hard to focus their attention on conversations. It is hard for them to \"read\" the behavior and moods of others and express their own feelings. · Temper. They may get easily frustrated. This often can make it harder for them to deal with stress. These adults may overreact and have a short, quick temper. · The ability to solve problems. Adults who have a hard time waiting for things they want may act before they think about the effect of their actions. They may take part in risky behaviors. These include unprotected sex, unsafe driving, alcohol and drug use, or unwise business ventures. How is ADHD treated? ADHD can be treated with medicines, behavior training, or counseling. Or it may be a combination of these treatments. Medicines  Stimulant medicines are most often used to treat ADHD. These may include:  · Amphetamines (such as Adderall and Dexedrine).   · Methylphenidate (such as Concerta, Daytrana, Focalin, Metadate, and Ritalin). Other medicines that may be used are:  · Atomoxetine, such as Strattera, a nonstimulant medicine for ADHD. · Antihypertensives. These include clonidine (such as Catapres) and guanfacine (such as Tenex). · Antidepressants, which include bupropion (Wellbutrin). Behavior training  Behavior training can help adults with ADHD learn how to:  · Get organized. A daily organizer or planner can help these adults organize their daily tasks. They can write down appointments and other things they need to remember. · Decrease distractions. They can set up their work or home environment so that there are fewer things that will distract them. They may find using headphones or a \"white noise\" machine helpful. College students can arrange a quiet living situation. They may need a single dorm room. · Work on relationships. Social skills training can help adults with ADHD relate to family, friends, and coworkers. Couples counseling or family therapy can also help improve relationships. Counseling  Counseling is not meant to treat inattention, hyperactivity, or impulsiveness. But it can help with some of the problems that go along with ADHD. These include not getting along well with others and having problems following rules. Where can you learn more? Go to http://saida-huy.info/. Enter Q254 in the search box to learn more about \"Learning About Attention Deficit Hyperactivity Disorder (ADHD) in Adults. \"  Current as of: July 26, 2016  Content Version: 11.3  © 9298-8485 [employer]. Care instructions adapted under license by [employer] (which disclaims liability or warranty for this information). If you have questions about a medical condition or this instruction, always ask your healthcare professional. [address] any warranty or liability for your use of this information.

## 2017-08-06 LAB
A ALTERNATA IGE QN: <0.1 KU/L
A FUMIGATUS IGE QN: <0.1 KU/L
ALBUMIN SERPL-MCNC: 4.4 G/DL (ref 3.5–5.5)
ALBUMIN/GLOB SERPL: 1.3 {RATIO} (ref 1.2–2.2)
ALP SERPL-CCNC: 89 IU/L (ref 39–117)
ALT SERPL-CCNC: 10 IU/L (ref 0–44)
AST SERPL-CCNC: 16 IU/L (ref 0–40)
BASOPHILS # BLD AUTO: 0 X10E3/UL (ref 0–0.2)
BASOPHILS NFR BLD AUTO: 0 %
BERMUDA GRASS IGE QN: <0.1 KU/L
BILIRUB SERPL-MCNC: 0.5 MG/DL (ref 0–1.2)
BOXELDER IGE QN: <0.1 KU/L
BUN SERPL-MCNC: 17 MG/DL (ref 6–20)
BUN/CREAT SERPL: 17 (ref 9–20)
C HERBARUM IGE QN: <0.1 KU/L
CALCIUM SERPL-MCNC: 9.9 MG/DL (ref 8.7–10.2)
CAT DANDER IGE QN: 1.06 KU/L
CHLORIDE SERPL-SCNC: 99 MMOL/L (ref 96–106)
CLAM IGE QN: <0.1 KU/L
CMN PIGWEED IGE QN: <0.1 KU/L
CO2 SERPL-SCNC: 21 MMOL/L (ref 18–29)
CODFISH IGE QN: <0.1 KU/L
COMMON RAGWEED IGE QN: <0.1 KU/L
CORN IGE QN: <0.1 KU/L
COTTONWOOD IGE QN: <0.1 KU/L
COW MILK IGE QN: 0.18 KU/L
CRAB IGE QN: <0.1 KU/L
CREAT SERPL-MCNC: 1 MG/DL (ref 0.76–1.27)
D FARINAE IGE QN: 1.21 KU/L
D PTERONYSS IGE QN: 1.95 KU/L
DOG DANDER IGE QN: 0.31 KU/L
EGG WHITE IGE QN: <0.1 KU/L
EOSINOPHIL # BLD AUTO: 0.1 X10E3/UL (ref 0–0.4)
EOSINOPHIL NFR BLD AUTO: 2 %
ERYTHROCYTE [DISTWIDTH] IN BLOOD BY AUTOMATED COUNT: 14.5 % (ref 12.3–15.4)
GLOBULIN SER CALC-MCNC: 3.4 G/DL (ref 1.5–4.5)
GLUCOSE SERPL-MCNC: 74 MG/DL (ref 65–99)
HCT VFR BLD AUTO: 46.8 % (ref 37.5–51)
HGB BLD-MCNC: 15.2 G/DL (ref 12.6–17.7)
IGE SERPL-ACNC: 153 IU/ML (ref 0–100)
IMM GRANULOCYTES # BLD: 0 X10E3/UL (ref 0–0.1)
IMM GRANULOCYTES NFR BLD: 0 %
JOHNSON GRASS IGE QN: <0.1 KU/L
LOBSTER IGE QN: <0.1 KU/L
LYMPHOCYTES # BLD AUTO: 2 X10E3/UL (ref 0.7–3.1)
LYMPHOCYTES NFR BLD AUTO: 28 %
Lab: ABNORMAL
MCH RBC QN AUTO: 28.3 PG (ref 26.6–33)
MCHC RBC AUTO-ENTMCNC: 32.5 G/DL (ref 31.5–35.7)
MCV RBC AUTO: 87 FL (ref 79–97)
MONOCYTES # BLD AUTO: 0.7 X10E3/UL (ref 0.1–0.9)
MONOCYTES NFR BLD AUTO: 11 %
MOUSE URINE PROT IGE QN: <0.1 KU/L
MT JUNIPER IGE QN: 0.49 KU/L
NEUTROPHILS # BLD AUTO: 4.1 X10E3/UL (ref 1.4–7)
NEUTROPHILS NFR BLD AUTO: 59 %
OYSTER IGE QN: <0.1 KU/L
P NOTATUM IGE QN: <0.1 KU/L
PEANUT IGE QN: <0.1 KU/L
PECAN/HICK TREE IGE QN: <0.1 KU/L
PLATELET # BLD AUTO: 218 X10E3/UL (ref 150–379)
POTASSIUM SERPL-SCNC: 3.7 MMOL/L (ref 3.5–5.2)
PROT SERPL-MCNC: 7.8 G/DL (ref 6–8.5)
RBC # BLD AUTO: 5.38 X10E6/UL (ref 4.14–5.8)
ROACH IGE QN: 0.18 KU/L
SCALLOP IGE QN: <0.1 KU/L
SESAME SEED IGE QN: <0.1 KU/L
SHEEP SORREL IGE QN: <0.1 KU/L
SHRIMP IGE QN: 1.59 KU/L
SILVER BIRCH IGE QN: 0.13 KU/L
SODIUM SERPL-SCNC: 139 MMOL/L (ref 134–144)
SOYBEAN IGE QN: <0.1 KU/L
TIMOTHY IGE QN: 0.28 KU/L
WALNUT IGE QN: <0.1 KU/L
WBC # BLD AUTO: 7 X10E3/UL (ref 3.4–10.8)
WHEAT IGE QN: <0.1 KU/L
WHITE ELM IGE QN: <0.1 KU/L
WHITE MULBERRY IGE QN: <0.1 KU/L
WHITE OAK IGE QN: 0.74 KU/L

## 2017-08-06 NOTE — PROGRESS NOTES
Please let him know that there are several moderate and a couple of high (including dust mites and shrimp). Of note, he has no reaction to lobster/crab/oyster. He does have reaction to shrimp which is moderate so avoidance would be urged. He also has several mild ones that I would like him to take into account and take Zyrtec or allegra when items are blooming.

## 2017-08-08 NOTE — PROGRESS NOTES
Letter made and discussed. Added on the octopus and squid lab desired. Will look out for the results.

## 2017-08-15 LAB
SPECIMEN STATUS REPORT, ROLRST: NORMAL
SQUID IGE QN: <0.1 KU/L

## 2017-11-10 DIAGNOSIS — G43.009 MIGRAINE WITHOUT AURA AND WITHOUT STATUS MIGRAINOSUS, NOT INTRACTABLE: ICD-10-CM

## 2017-11-10 RX ORDER — TOPIRAMATE 50 MG/1
50 TABLET, FILM COATED ORAL 2 TIMES DAILY
Qty: 60 TAB | Refills: 3 | Status: SHIPPED | OUTPATIENT
Start: 2017-11-10 | End: 2019-07-08 | Stop reason: SDUPTHER

## 2017-11-27 ENCOUNTER — OFFICE VISIT (OUTPATIENT)
Dept: INTERNAL MEDICINE CLINIC | Age: 22
End: 2017-11-27

## 2017-11-27 VITALS
HEART RATE: 108 BPM | HEIGHT: 70 IN | BODY MASS INDEX: 32.38 KG/M2 | OXYGEN SATURATION: 97 % | DIASTOLIC BLOOD PRESSURE: 78 MMHG | TEMPERATURE: 98.9 F | RESPIRATION RATE: 16 BRPM | SYSTOLIC BLOOD PRESSURE: 104 MMHG | WEIGHT: 226.2 LBS

## 2017-11-27 DIAGNOSIS — R05.9 COUGH: ICD-10-CM

## 2017-11-27 DIAGNOSIS — H61.21 IMPACTED CERUMEN OF RIGHT EAR: ICD-10-CM

## 2017-11-27 DIAGNOSIS — J20.9 BRONCHITIS WITH BRONCHOSPASM: Primary | ICD-10-CM

## 2017-11-27 RX ORDER — METHYLPREDNISOLONE 4 MG/1
TABLET ORAL
Qty: 1 DOSE PACK | Refills: 0 | Status: SHIPPED | OUTPATIENT
Start: 2017-11-27 | End: 2019-08-09 | Stop reason: ALTCHOICE

## 2017-11-27 RX ORDER — BENZONATATE 200 MG/1
200 CAPSULE ORAL
Qty: 21 CAP | Refills: 0 | Status: SHIPPED | OUTPATIENT
Start: 2017-11-27 | End: 2017-12-04

## 2017-11-27 RX ORDER — AZITHROMYCIN 250 MG/1
250 TABLET, FILM COATED ORAL SEE ADMIN INSTRUCTIONS
Qty: 6 TAB | Refills: 0 | Status: SHIPPED | OUTPATIENT
Start: 2017-11-27 | End: 2017-12-02

## 2017-11-27 RX ORDER — ALBUTEROL SULFATE 90 UG/1
1 AEROSOL, METERED RESPIRATORY (INHALATION)
Qty: 1 INHALER | Refills: 0 | Status: CANCELLED | OUTPATIENT
Start: 2017-11-27

## 2017-11-27 NOTE — PROGRESS NOTES
Pt here to be seen for cough and congestion, runny nose that began last Tuesday, he states that on Thursday it began to get worse. He is unsure if he has had a fever. He is also having some chest tightness and pressure. Wheezing noted bilaterally lower lobes. He states that he is constantly coughing and occasionally has mucous that comes up. He states that when he does cough he has pain in his chest as well as the back of his neck. Chief Complaint   Patient presents with    Cough     congestion      Visit Vitals    /78 (BP 1 Location: Right arm, BP Patient Position: Sitting)    Pulse (!) 108    Temp 98.9 °F (37.2 °C) (Oral)    Resp 16    Ht 5' 10\" (1.778 m)    Wt 226 lb 3.2 oz (102.6 kg)    SpO2 97%    BMI 32.46 kg/m2     1. Have you been to the ER, urgent care clinic since your last visit? Hospitalized since your last visit? No    2. Have you seen or consulted any other health care providers outside of the [address] [place] [address] since your last visit? Include any pap smears or colon screening.  No

## 2017-11-27 NOTE — MR AVS SNAPSHOT
Visit Information Date & Time Provider Department Dept. Phone Encounter #  
 11/27/2017  2:30 PM Sakshi Ruff MD [facility] Internal Medicine [phone] [id number] Upcoming Health Maintenance Date Due DTaP/Tdap/Td series (1 - Tdap) 12/27/2014 Allergies as of 11/27/2017  Review Complete On: 11/27/2017 By: Sakshi Ruff MD  
 No Known Allergies Current Immunizations  Never Reviewed No immunizations on file. Not reviewed this visit You Were Diagnosed With   
  
 Codes Comments Bronchitis with bronchospasm    -  Primary ICD-10-CM: J20.9 ICD-9-CM: 597 Cough     ICD-10-CM: R05 ICD-9-CM: 786.2 Impacted cerumen of right ear     ICD-10-CM: H61.21 ICD-9-CM: 380.4 Vitals BP Pulse Temp Resp Height(growth percentile) Weight(growth percentile) 104/78 (BP 1 Location: Right arm, BP Patient Position: Sitting) (!) 108 98.9 °F (37.2 °C) (Oral) 16 5' 10\" (1.778 m) 226 lb 3.2 oz (102.6 kg) SpO2 BMI Smoking Status 97% 32.46 kg/m2 Never Smoker Vitals History BMI and BSA Data Body Mass Index Body Surface Area  
 32.46 kg/m 2 2.25 m 2 Preferred Pharmacy Pharmacy Name Phone [facility] [address] [place] [address] [phone] Your Updated Medication List  
  
   
This list is accurate as of: 11/27/17  3:26 PM.  Always use your most recent med list.  
  
  
  
  
 albuterol 90 mcg/actuation inhaler Commonly known as:  PROVENTIL HFA, VENTOLIN HFA, PROAIR HFA Take 1 Puff by inhalation every four (4) hours as needed for Wheezing or Shortness of Breath. azithromycin 250 mg tablet Commonly known as:  Charlotta Primrose Take 1 Tab by mouth See Admin Instructions for 5 days. benzonatate 200 mg capsule Commonly known as:  TESSALON Take 1 Cap by mouth three (3) times daily as needed for Cough for up to 7 days. fluticasone 50 mcg/actuation nasal spray Commonly known as:  Ang Gut 2 Sprays by Both Nostrils route daily. methylPREDNISolone 4 mg tablet Commonly known as:  Bosie Pippins As directed. SUMAtriptan 50 mg tablet Commonly known as:  IMITREX Take 1 Tab by mouth once as needed for Migraine for up to 1 dose. topiramate 50 mg tablet Commonly known as:  TOPAMAX Take 1 Tab by mouth two (2) times a day. Prescriptions Sent to Pharmacy Refills  
 azithromycin (ZITHROMAX) 250 mg tablet 0 Sig: Take 1 Tab by mouth See Admin Instructions for 5 days. Class: Normal  
 Pharmacy: [address], [address] Ph #: [phone] Route: Oral  
 benzonatate (TESSALON) 200 mg capsule 0 Sig: Take 1 Cap by mouth three (3) times daily as needed for Cough for up to 7 days. Class: Normal  
 Pharmacy: [address] Ph #: [phone] Route: Oral  
 methylPREDNISolone (MEDROL DOSEPACK) 4 mg tablet 0 Sig: As directed. Class: Normal  
 Pharmacy: [address] Ph #: [phone] We Performed the Following REMOVE IMPACTED EAR WAX [19395 CPT(R)] Introducing [facility]! Gregoria Carr introduces [employer] patient portal. Now you can access parts of your medical record, email your doctor's office, and request medication refills online. 1. In your internet browser, go to [email or web address] 2. Click on the First Time User? Click Here link in the Sign In box. You will see the New Member Sign Up page. 3. Enter your [employer] Access Code exactly as it appears below. You will not need to use this code after youve completed the sign-up process.  If you do not sign up before the expiration date, you must request a new code. · [employer] Access Code: [email or web address] Expires: 2/25/2018  3:26 PM 
 
4. Enter the last four digits of your Social Security Number (xxxx) and Date of Birth (mm/dd/yyyy) as indicated and click Submit. You will be taken to the next sign-up page. 5. Create a [employer] ID. This will be your [employer] login ID and cannot be changed, so think of one that is secure and easy to remember. 6. Create a [employer] password. You can change your password at any time. 7. Enter your Password Reset Question and Answer. This can be used at a later time if you forget your password. 8. Enter your e-mail address. You will receive e-mail notification when new information is available in [address] 9. Click Sign Up. You can now view and download portions of your medical record. 10. Click the Download Summary menu link to download a portable copy of your medical information. If you have questions, please visit the Frequently Asked Questions section of the [employer] website. Remember, [employer] is NOT to be used for urgent needs. For medical emergencies, dial 911. Now available from your iPhone and Android! Please provide this summary of care documentation to your next provider. Your primary care clinician is listed as Shan Arambula. If you have any questions after today's visit, please call [phone].

## 2017-11-27 NOTE — LETTER
NOTIFICATION RETURN TO WORK / SCHOOL 
 
11/27/2017 3:22 PM 
 
Mr. Mahi Oliveros [address] [facility] [id number] To Whom It May Concern: 
 
Mahi Oliveros is currently under the care of [address] [place] [address]. He was seen in the office today for bronchitis. Medications have been given and rest recommended. He will return to work/school on: 11/29/2017. If there are questions or concerns please have the patient contact our office. Sincerely, Cal Spurling, MD

## 2017-11-27 NOTE — PROGRESS NOTES
Written by Cam Carpenter, as dictated by Dr. Kaylah Lanier MD.    Grady Branham is a 19 y.o. male. HPI  The patient comes in today c/o cough (occasionally productive), congestion, headaches, and chest tightness x 1 week, though his sxs worsened after 11/23. He has been taking Mucinex, Zofran, Robitussin with codeine (prescribed for a previous cough), and Delsym, but has not been feeling better. Excedrin has eased the headache pain for a few hours but the pain always comes back. He has been having ear fullness as well. He is compliant on Topamax which has been helping with his migraines. 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 60 Tab 3    SUMAtriptan (IMITREX) 50 mg tablet Take 1 Tab by mouth once as needed for Migraine for up to 1 dose. 30 Tab 3    fluticasone (FLONASE) 50 mcg/actuation nasal spray 2 Sprays by Both Nostrils route daily. 1 Bottle 0    albuterol (PROVENTIL HFA, VENTOLIN HFA, PROAIR HFA) 90 mcg/actuation inhaler Take 1 Puff by inhalation every four (4) hours as needed for Wheezing or Shortness of Breath. 1 Inhaler 0     No current facility-administered medications on file prior to visit.         Past Medical History:   Diagnosis Date    Headache     Headache(784.0)        Family History   Problem Relation Age of Onset    Diabetes Mother     No Known Problems Father     Diabetes Maternal Grandmother     Cancer Maternal Grandfather        Social History     Social History    Marital status: SINGLE     Spouse name: N/A    Number of children: N/A    Years of education: N/A     Occupational History    Not on file. Social History Main Topics    Smoking status: Never Smoker    Smokeless tobacco: Never Used    Alcohol use No    Drug use: No    Sexual activity: Not on file     Other Topics Concern    Not on file     Social History Narrative       Review of Systems   Constitutional: Negative for malaise/fatigue. HENT: Positive for congestion. Eyes: Negative for blurred vision and pain. Respiratory: Positive for cough and sputum production. Negative for shortness of breath. Cardiovascular: Positive for chest pain. Negative for palpitations. Gastrointestinal: Negative for abdominal pain and heartburn. Genitourinary: Negative for frequency and urgency. Musculoskeletal: Negative for joint pain and myalgias. Neurological: Positive for headaches. Negative for dizziness, tingling, sensory change and weakness. Psychiatric/Behavioral: Negative for depression, memory loss and substance abuse. Visit Vitals    /78 (BP 1 Location: Right arm, BP Patient Position: Sitting)    Pulse (!) 108    Temp 98.9 °F (37.2 °C) (Oral)    Resp 16    Ht 5' 10\" (1.778 m)    Wt 226 lb 3.2 oz (102.6 kg)    SpO2 97%    BMI 32.46 kg/m2       Physical Exam   Constitutional: He is oriented to person, place, and time. He appears well-developed. No distress. Obese   HENT:   Left Ear: External ear normal.   Fluid behind tympanic membranes, BL cerumen impaction (R more than L)   Eyes: Conjunctivae and EOM are normal. Right eye exhibits no discharge. Left eye exhibits no discharge. Neck: Normal range of motion. Neck supple. Cardiovascular: Normal rate and regular rhythm. Pulmonary/Chest: Effort normal. He has wheezes. BL coarse rhonchi and expiratory wheezing   Abdominal: Soft. Bowel sounds are normal. There is no tenderness. Lymphadenopathy:     He has no cervical adenopathy. Neurological: He is alert and oriented to person, place, and time. Skin: He is not diaphoretic. Psychiatric: He has a normal mood and affect. His behavior is normal.   Nursing note and vitals reviewed. ASSESSMENT and PLAN    ICD-10-CM ICD-9-CM    1. Bronchitis with bronchospasm J20.9 490 azithromycin (ZITHROMAX) 250 mg tablet sent to pharmacy      methylPREDNISolone (MEDROL DOSEPACK) 4 mg tablet sent to pharmacy    Nebulizer tx performed in office. I want him to start on a Z-pack. I also want him to start on a Medrol dosepack. If he does not feel better by Wednesday he should let me know, will do CXR. 2. Cough R05 786.2 benzonatate (TESSALON) 200 mg capsule sent to pharmacy    Tessalon given for cough. 3. Impacted cerumen of right ear H61.21 380.4 REMOVE IMPACTED EAR WAX    Time out: Immediately prior to procedure a \"time out\" was called to verify the correct patient, procedure, equipment, support staff and site/side marked as required. Ear wash performed in office. Pt tolerated well. This plan was reviewed with the patient and patient agrees. All questions were answered. This scribe documentation was reviewed by me and accurately reflects the examination and decisions made by me. This note will not be viewable in [address]

## 2017-12-05 DIAGNOSIS — J98.01 BRONCHOSPASM: Primary | ICD-10-CM

## 2017-12-05 DIAGNOSIS — J98.01 BRONCHOSPASM: ICD-10-CM

## 2017-12-05 RX ORDER — FLUTICASONE PROPIONATE 220 UG/1
1 AEROSOL, METERED RESPIRATORY (INHALATION) 2 TIMES DAILY
Qty: 1 INHALER | Refills: 0 | Status: SHIPPED | OUTPATIENT
Start: 2017-12-05 | End: 2017-12-05 | Stop reason: SDUPTHER

## 2017-12-05 RX ORDER — BENZONATATE 200 MG/1
200 CAPSULE ORAL
Qty: 21 CAP | Refills: 0 | Status: SHIPPED | OUTPATIENT
Start: 2017-12-05 | End: 2017-12-12

## 2017-12-05 RX ORDER — FLUTICASONE PROPIONATE 220 UG/1
1 AEROSOL, METERED RESPIRATORY (INHALATION) 2 TIMES DAILY
Qty: 1 INHALER | Refills: 0 | Status: SHIPPED | OUTPATIENT
Start: 2017-12-05 | End: 2018-01-04

## 2017-12-05 RX ORDER — BENZONATATE 200 MG/1
200 CAPSULE ORAL
Qty: 21 CAP | Refills: 0 | Status: SHIPPED | OUTPATIENT
Start: 2017-12-05 | End: 2017-12-05 | Stop reason: SDUPTHER

## 2017-12-05 NOTE — PROGRESS NOTES
Pt needed this sent to a different pharmacy, have sent Rx's to requested pharmacy and canceled at [facility].

## 2019-03-07 ENCOUNTER — OFFICE VISIT (OUTPATIENT)
Dept: PRIMARY CARE CLINIC | Age: 24
End: 2019-03-07

## 2019-03-07 VITALS
OXYGEN SATURATION: 96 % | BODY MASS INDEX: 36.33 KG/M2 | HEIGHT: 70 IN | HEART RATE: 96 BPM | DIASTOLIC BLOOD PRESSURE: 77 MMHG | SYSTOLIC BLOOD PRESSURE: 119 MMHG | RESPIRATION RATE: 16 BRPM | WEIGHT: 253.8 LBS | TEMPERATURE: 98 F

## 2019-03-07 DIAGNOSIS — F32.A DEPRESSION, UNSPECIFIED DEPRESSION TYPE: Primary | ICD-10-CM

## 2019-03-07 DIAGNOSIS — F98.8 ATTENTION DEFICIT DISORDER (ADD) WITHOUT HYPERACTIVITY: ICD-10-CM

## 2019-03-07 RX ORDER — ESCITALOPRAM OXALATE 10 MG/1
10 TABLET ORAL DAILY
Qty: 30 TAB | Refills: 2 | Status: SHIPPED | OUTPATIENT
Start: 2019-03-07 | End: 2019-03-07 | Stop reason: CLARIF

## 2019-03-07 RX ORDER — DEXTROAMPHETAMINE SACCHARATE, AMPHETAMINE ASPARTATE, DEXTROAMPHETAMINE SULFATE AND AMPHETAMINE SULFATE 2.5; 2.5; 2.5; 2.5 MG/1; MG/1; MG/1; MG/1
10 TABLET ORAL
COMMUNITY
End: 2019-12-27 | Stop reason: SDUPTHER

## 2019-03-07 RX ORDER — ESCITALOPRAM OXALATE 10 MG/1
10 TABLET ORAL DAILY
Qty: 30 TAB | Refills: 2 | Status: SHIPPED | OUTPATIENT
Start: 2019-03-07 | End: 2019-05-31 | Stop reason: SDUPTHER

## 2019-03-07 RX ORDER — ONDANSETRON 4 MG/1
4 TABLET, FILM COATED ORAL
COMMUNITY
End: 2019-03-13 | Stop reason: SDUPTHER

## 2019-03-07 NOTE — PROGRESS NOTES
Chief Complaint   Patient presents with    Depression     states that he has felt especially depressed lately.

## 2019-03-07 NOTE — PROGRESS NOTES
[facility]   [address], [address] [facility]  P: [phone]  F: [phone]      Chief Complaint   Patient presents with    Depression     states that he has felt especially depressed lately. Cuba Rodriguez is a 20 y.o. male who presents to clinic for Depression (states that he has felt especially depressed lately. ). HPI:  The patient \" Giorgi Ling" presents with depression worsening over the past 1-2 months. He states he doesn't have a desire to do things he previously enjoyed. He has insomnia many nights. He denies SI or HI today. 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 He is hopeful he will sleep better when he is not up early delivering papers. He is working part-time at [employer] but planning on working there full time going forward. He is pursuing counseling and denies needing further resources today. He has a hx of ADHD but only takes adderall on an as needed basis. Denies needing refill today. Ongoing stomach discomfort, GERD symptoms with occasional diarrhea. He uses PRN zofran with some relief. Patient Active Problem List    Diagnosis    Pain Disorder Associated w/ Both Psychological & Medical Factors    Depressive disorder, not elsewhere classified    Adjustment disorder with anxiety    Attention deficit disorder without mention of hyperactivity    ADD (attention deficit disorder)      Past Medical History:   Diagnosis Date    Headache     Headache(784.0)      History reviewed. No pertinent surgical history.   Social History     Socioeconomic History    Marital status: SINGLE     Spouse name: Not on file    Number of children: Not on file    Years of education: Not on file    Highest education level: Not on file   Social Needs    Financial resource strain: Not on file    Food insecurity - worry: Not on file    Food insecurity - inability: Not on file   BeMyGuest needs - medical: Not on file   [employer] needs - non-medical: Not on file   Occupational History    Not on file   Tobacco Use    Smoking status: Never Smoker    Smokeless tobacco: Never Used   Substance and Sexual Activity    Alcohol use: No    Drug use: No    Sexual activity: Not on file   Other Topics Concern    Not on file   Social History Narrative    Not on file     Family History   Problem Relation Age of Onset    Diabetes Mother     No Known Problems Father     Diabetes Maternal Grandmother     Cancer Maternal Grandfather      No Known Allergies    Current Outpatient Medications   Medication Sig Dispense Refill    dextroamphetamine-amphetamine (ADDERALL) 10 mg tablet Take 10 mg by mouth.  ondansetron hcl (ZOFRAN) 4 mg tablet Take 4 mg by mouth every eight (8) hours as needed for Nausea.  escitalopram oxalate (LEXAPRO) 10 mg tablet Take 1 Tab by mouth daily. 30 Tab 2    topiramate (TOPAMAX) 50 mg tablet Take 1 Tab by mouth two (2) times a day. 60 Tab 3    SUMAtriptan (IMITREX) 50 mg tablet Take 1 Tab by mouth once as needed for Migraine for up to 1 dose. 30 Tab 3    methylPREDNISolone (MEDROL DOSEPACK) 4 mg tablet As directed. 1 Dose Pack 0    fluticasone (FLONASE) 50 mcg/actuation nasal spray 2 Sprays by Both Nostrils route daily. 1 Bottle 0    albuterol (PROVENTIL HFA, VENTOLIN HFA, PROAIR HFA) 90 mcg/actuation inhaler Take 1 Puff by inhalation every four (4) hours as needed for Wheezing or Shortness of Breath. 1 Inhaler 0           The medications were reviewed and updated in the medical record. The past medical history, past surgical history, and family history were reviewed and updated in the medical record. REVIEW OF SYSTEMS   Review of Systems   Constitutional: Negative for malaise/fatigue. HENT: Negative for congestion. Eyes: Negative for blurred vision and pain. Respiratory: Negative for cough and shortness of breath. Cardiovascular: Negative for chest pain and palpitations. Gastrointestinal: Positive for diarrhea and nausea. Negative for abdominal pain and heartburn. Genitourinary: Negative for frequency and urgency. Musculoskeletal: Negative for joint pain and myalgias. Neurological: Negative for dizziness, tingling, sensory change, weakness and headaches. Psychiatric/Behavioral: Positive for depression. Negative for memory loss, substance abuse and suicidal ideas. The patient has insomnia. The patient is not nervous/anxious. PHYSICAL EXAM     Visit Vitals  /77 (BP 1 Location: Right arm, BP Patient Position: Sitting)   Pulse 96   Temp 98 °F (36.7 °C) (Oral)   Resp 16   Ht 5' 10\" (1.778 m)   Wt 253 lb 12.8 oz (115.1 kg)   SpO2 96%   BMI 36.42 kg/m²       Physical Exam   Constitutional: He is oriented to person, place, and time and well-developed, well-nourished, and in no distress. BMI 36   HENT:   Head: Normocephalic and atraumatic. Right Ear: External ear normal.   Left Ear: External ear normal.   Cardiovascular: Normal rate, regular rhythm and normal heart sounds. Pulmonary/Chest: Effort normal and breath sounds normal.   Musculoskeletal: Normal range of motion. He exhibits no edema. Neurological: He is alert and oriented to person, place, and time. Gait normal.   Skin: Skin is warm and dry. Psychiatric: Judgment normal. He expresses no homicidal and no suicidal ideation. He has a flat affect. Nursing note and vitals reviewed. ASSESSMENT/ PLAN   Diagnoses and all orders for this visit:    1. Depression, unspecified depression type  -     escitalopram oxalate (LEXAPRO) 10 mg tablet; Take 1 Tab by mouth daily.  -I've explained to him that drugs of the SSRI class can have side effects such as weight gain, sexual dysfunction, insomnia, headache, nausea. These medications are generally effective at alleviating symptoms of anxiety and/or depression.  Let me know if significant side effects do occur. May use tylenol/ advil PRN for headache for next few weeks. 2. ADD without hyperactivity      - Continue PRN aderrall, no refill today. We discussed returning to daily dose but he believes his depression was worse when taking consistently so holding for now. Follow-up Disposition:  Return in about 1 month (around 4/7/2019) for Depression F/U, med f/u . Disclaimer:  Advised patient to call back or return to office if symptoms worsen/change/persist.  Discussed expected course/resolution/complications of diagnosis in detail with patient.     Medication risks/benefits/alternatives discussed with patient. Patient was given an after visit summary which includes diagnoses, current medications, & vitals.      Discussed patient instructions and advised to read to all patient instructions regarding care.      Patient expressed understanding with the diagnosis and plan. This note will not be viewable in [address]         Latha Mendoza NP  3/7/2019        (This document has been electronically signed)

## 2019-03-13 RX ORDER — ONDANSETRON 4 MG/1
4 TABLET, FILM COATED ORAL
Qty: 20 TAB | Refills: 0 | Status: SHIPPED | OUTPATIENT
Start: 2019-03-13

## 2019-05-31 DIAGNOSIS — F32.A DEPRESSION, UNSPECIFIED DEPRESSION TYPE: ICD-10-CM

## 2019-05-31 RX ORDER — ESCITALOPRAM OXALATE 10 MG/1
10 TABLET ORAL DAILY
Qty: 90 TAB | Refills: 1 | Status: SHIPPED | OUTPATIENT
Start: 2019-05-31 | End: 2019-08-09 | Stop reason: SDUPTHER

## 2019-07-08 DIAGNOSIS — G43.009 MIGRAINE WITHOUT AURA AND WITHOUT STATUS MIGRAINOSUS, NOT INTRACTABLE: ICD-10-CM

## 2019-07-08 RX ORDER — TOPIRAMATE 50 MG/1
50 TABLET, FILM COATED ORAL 2 TIMES DAILY
Qty: 180 TAB | Refills: 1 | Status: SHIPPED | OUTPATIENT
Start: 2019-07-08 | End: 2019-12-27 | Stop reason: SDUPTHER

## 2019-08-09 ENCOUNTER — OFFICE VISIT (OUTPATIENT)
Dept: PRIMARY CARE CLINIC | Age: 24
End: 2019-08-09

## 2019-08-09 VITALS
DIASTOLIC BLOOD PRESSURE: 78 MMHG | BODY MASS INDEX: 36.59 KG/M2 | OXYGEN SATURATION: 97 % | HEART RATE: 75 BPM | HEIGHT: 70 IN | RESPIRATION RATE: 16 BRPM | WEIGHT: 255.6 LBS | SYSTOLIC BLOOD PRESSURE: 117 MMHG | TEMPERATURE: 97.7 F

## 2019-08-09 DIAGNOSIS — F32.A DEPRESSION, UNSPECIFIED DEPRESSION TYPE: Primary | ICD-10-CM

## 2019-08-09 DIAGNOSIS — E66.01 SEVERE OBESITY (HCC): ICD-10-CM

## 2019-08-09 DIAGNOSIS — Z86.69 HX OF MIGRAINES: ICD-10-CM

## 2019-08-09 RX ORDER — ESCITALOPRAM OXALATE 20 MG/1
20 TABLET ORAL DAILY
Qty: 30 TAB | Refills: 2 | Status: SHIPPED | OUTPATIENT
Start: 2019-08-09 | End: 2019-12-05 | Stop reason: SDUPTHER

## 2019-08-09 NOTE — PROGRESS NOTES
Yuriy Chou is a 20 y.o. male    Chief Complaint   Patient presents with    Medication Evaluation     6 mos f/u       1. Have you been to the ER, urgent care clinic since your last visit? Hospitalized since your last visit? No    2. Have you seen or consulted any other health care providers outside of the [address] [place] [address] [place] since your last visit? Include any pap smears or colon screening.  No    ADL Assessment 6/22/2016   Feeding yourself No Help Needed   Getting from bed to chair No Help Needed   Getting dressed No Help Needed   Bathing or showering No Help Needed   Walk across the room (includes cane/walker) No Help Needed   Using the telphone No Help Needed   Taking your medications No Help Needed   Preparing meals No Help Needed   Managing money (expenses/bills) No Help Needed   Moderately strenuous housework (laundry) No Help Needed   Shopping for personal items (toiletries/medicines) No Help Needed   Shopping for groceries No Help Needed   Driving No Help Needed   Climbing a flight of stairs No Help Needed   Getting to places beyond walking distances No Help Needed        Health Maintenance Due   Topic Date Due    DTaP/Tdap/Td series (1 - Tdap) 12/27/2014    Influenza Age 5 to Adult  08/01/2019

## 2019-08-09 NOTE — PROGRESS NOTES
[facility]   [address] [facility], [address] [place] [address]  P: [phone]  F: [phone]      Chief Complaint   Patient presents with    Medication Evaluation     6 mos f/u       SUBJECTIVE   Alison Gonzalez is a 20 y.o. male who presents to clinic for Medication Evaluation (6 mos f/u). HPI:    Charlotte Dickson is a 68-year-old male who presents today for follow-up on depression. He is currently on Lexapro 10 mg daily, but is wondering if he should increase his medication as he feels like it worked at first, but is now not working. He states over the last 2 weeks that he has felt hopeless, and is unable to get out of the house. He finds himself sleeping not only at night, but taking naps during the day. He continues to work at [employer], but feels like he will be unable to advance with the company. He would like to start looking for new jobs, but finds it is difficult to complete any task. he does state that he will be starting some [employer] classes this fall which he hopes will lead him to new employment. He denies any SI or HI today. 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 No pertinent surgical history.   Social History     Socioeconomic History    Marital status: SINGLE     Spouse name: Not on file    Number of children: Not on file    Years of education: Not on file    Highest education level: Not on file   Occupational History    Not on file   Social Needs    Financial resource strain: Not on file    Food insecurity:     Worry: Not on file     Inability: Not on file    Transportation needs: Medical: Not on file     Non-medical: Not on file   Tobacco Use    Smoking status: Never Smoker    Smokeless tobacco: Never Used   Substance and Sexual Activity    Alcohol use: Yes     Alcohol/week: 6.0 standard drinks     Types: 6 Cans of beer per week    Drug use: No    Sexual activity: Not on file   Lifestyle    Physical activity:     Days per week: Not on file     Minutes per session: Not on file    Stress: Not on file   Relationships    Social connections:     Talks on phone: Not on file     Gets together: Not on file     Attends Christianity service: Not on file     Active member of club or organization: Not on file     Attends meetings of clubs or organizations: Not on file     Relationship status: Not on file    Intimate partner violence:     Fear of current or ex partner: Not on file     Emotionally abused: Not on file     Physically abused: Not on file     Forced sexual activity: Not on file   Other Topics Concern    Not on file   Social History Narrative    Not on file     Family History   Problem Relation Age of Onset    Diabetes Mother     No Known Problems Father     Diabetes Maternal Grandmother     Cancer Maternal Grandfather      No Known Allergies    Current Outpatient Medications   Medication Sig Dispense Refill    escitalopram oxalate (LEXAPRO) 20 mg tablet Take 1 Tab by mouth daily. Indications: major depressive disorder 30 Tab 2    topiramate (TOPAMAX) 50 mg tablet Take 1 Tab by mouth two (2) times a day. 180 Tab 1    ondansetron hcl (ZOFRAN) 4 mg tablet Take 1 Tab by mouth every eight (8) hours as needed for Nausea. 20 Tab 0    dextroamphetamine-amphetamine (ADDERALL) 10 mg tablet Take 10 mg by mouth.  SUMAtriptan (IMITREX) 50 mg tablet Take 1 Tab by mouth once as needed for Migraine for up to 1 dose. 30 Tab 3    fluticasone (FLONASE) 50 mcg/actuation nasal spray 2 Sprays by Both Nostrils route daily.  1 Bottle 0    albuterol (PROVENTIL HFA, VENTOLIN HFA, PROAIR HFA) 90 mcg/actuation inhaler Take 1 Puff by inhalation every four (4) hours as needed for Wheezing or Shortness of Breath. 1 Inhaler 0           The medications were reviewed and updated in the medical record. The past medical history, past surgical history, and family history were reviewed and updated in the medical record. REVIEW OF SYSTEMS   Review of Systems   Constitutional: Negative for malaise/fatigue. HENT: Negative for congestion. Eyes: Negative for blurred vision and pain. Respiratory: Negative for cough and shortness of breath. Cardiovascular: Negative for chest pain and palpitations. Gastrointestinal: Negative for abdominal pain and heartburn. Genitourinary: Negative for frequency and urgency. Musculoskeletal: Negative for joint pain and myalgias. Neurological: Negative for dizziness, tingling, sensory change, weakness and headaches. Psychiatric/Behavioral: Positive for depression. Negative for memory loss and substance abuse. PHYSICAL EXAM     Visit Vitals  /78 (BP 1 Location: Left arm, BP Patient Position: Sitting)   Pulse 75   Temp 97.7 °F (36.5 °C) (Oral)   Resp 16   Ht 5' 10\" (1.778 m)   Wt 255 lb 9.6 oz (115.9 kg)   SpO2 97%   BMI 36.67 kg/m²       Physical Exam   Constitutional: He is oriented to person, place, and time and well-developed, well-nourished, and in no distress. HENT:   Head: Normocephalic and atraumatic. Right Ear: External ear normal.   Left Ear: External ear normal.   Cardiovascular: Normal rate, regular rhythm and normal heart sounds. Pulmonary/Chest: Effort normal and breath sounds normal.   Musculoskeletal: Normal range of motion. He exhibits no edema. Neurological: He is alert and oriented to person, place, and time. Gait normal.   Skin: Skin is warm and dry. Psychiatric: Judgment normal. He expresses no homicidal and no suicidal ideation. He has a flat affect. Nursing note and vitals reviewed.     ASSESSMENT/ PLAN   Diagnoses and all orders for this visit:    1. Depression, unspecified depression type  -     escitalopram oxalate (LEXAPRO) 20 mg tablet; Take 1 Tab by mouth daily. Indications: major depressive disorder  -Encouraged him to consider counseling again.  -Encouraged physical activity and healthy diet. -Return in 1 month for follow-up. Discussed Wellbutrin if depression is not improving. 2. Severe obesity ([address]      BMI discussed with patient. Discussed lifestyle changes, daily physical activity, and advised 150 minutes of exercise weekly. Discussed healthy diet choices and limiting fried, fatty foods, fast foods, processed foods, sugar-sweetened beverages/soda, and added sugars. Increase fruits, vegetables, low-fat dairy products, lean proteins, and whole grains. 3. Hx of migraines      -Stable, continues on Topamax. Disclaimer:  Advised patient to call back or return to office if symptoms worsen/change/persist.  Discussed expected course/resolution/complications of diagnosis in detail with patient.     Medication risks/benefits/alternatives discussed with patient. Patient was given an after visit summary which includes diagnoses, current medications, & vitals.      Discussed patient instructions and advised to read to all patient instructions regarding care.      Patient expressed understanding with the diagnosis and plan. This note will not be viewable in [address]         Calvin Mccauley NP  8/9/2019        (This document has been electronically signed)

## 2019-08-22 DIAGNOSIS — F32.A DEPRESSION, UNSPECIFIED DEPRESSION TYPE: Primary | ICD-10-CM

## 2019-08-22 RX ORDER — BUPROPION HYDROCHLORIDE 75 MG/1
TABLET ORAL
Qty: 60 TAB | Refills: 0 | Status: SHIPPED | OUTPATIENT
Start: 2019-08-22 | End: 2019-09-25 | Stop reason: SDUPTHER

## 2019-10-10 DIAGNOSIS — F41.9 ANXIETY: Primary | ICD-10-CM

## 2019-10-10 RX ORDER — ALPRAZOLAM 0.5 MG/1
0.5 TABLET ORAL
Qty: 20 TAB | Refills: 0 | Status: SHIPPED | OUTPATIENT
Start: 2019-10-10 | End: 2019-12-27 | Stop reason: SDUPTHER

## 2019-12-05 DIAGNOSIS — F32.A DEPRESSION, UNSPECIFIED DEPRESSION TYPE: ICD-10-CM

## 2019-12-05 RX ORDER — ESCITALOPRAM OXALATE 20 MG/1
20 TABLET ORAL DAILY
Qty: 90 TAB | Refills: 1 | Status: SHIPPED | OUTPATIENT
Start: 2019-12-05 | End: 2019-12-27 | Stop reason: SDUPTHER

## 2019-12-27 ENCOUNTER — OFFICE VISIT (OUTPATIENT)
Dept: PRIMARY CARE CLINIC | Age: 24
End: 2019-12-27

## 2019-12-27 VITALS
BODY MASS INDEX: 37.8 KG/M2 | SYSTOLIC BLOOD PRESSURE: 106 MMHG | HEART RATE: 91 BPM | RESPIRATION RATE: 18 BRPM | WEIGHT: 264 LBS | TEMPERATURE: 98.4 F | OXYGEN SATURATION: 96 % | DIASTOLIC BLOOD PRESSURE: 74 MMHG | HEIGHT: 70 IN

## 2019-12-27 DIAGNOSIS — R06.2 WHEEZING: ICD-10-CM

## 2019-12-27 DIAGNOSIS — F41.0 PANIC ATTACKS: ICD-10-CM

## 2019-12-27 DIAGNOSIS — F32.A DEPRESSION, UNSPECIFIED DEPRESSION TYPE: ICD-10-CM

## 2019-12-27 DIAGNOSIS — G43.009 MIGRAINE WITHOUT AURA AND WITHOUT STATUS MIGRAINOSUS, NOT INTRACTABLE: ICD-10-CM

## 2019-12-27 DIAGNOSIS — F98.8 ATTENTION DEFICIT DISORDER (ADD) WITHOUT HYPERACTIVITY: Primary | ICD-10-CM

## 2019-12-27 RX ORDER — DEXTROAMPHETAMINE SACCHARATE, AMPHETAMINE ASPARTATE, DEXTROAMPHETAMINE SULFATE AND AMPHETAMINE SULFATE 2.5; 2.5; 2.5; 2.5 MG/1; MG/1; MG/1; MG/1
10 TABLET ORAL 2 TIMES DAILY
Qty: 60 TAB | Refills: 0 | Status: SHIPPED | OUTPATIENT
Start: 2019-12-27

## 2019-12-27 RX ORDER — ALBUTEROL SULFATE 90 UG/1
1 AEROSOL, METERED RESPIRATORY (INHALATION)
Qty: 1 INHALER | Refills: 0 | Status: SHIPPED | OUTPATIENT
Start: 2019-12-27

## 2019-12-27 RX ORDER — ESCITALOPRAM OXALATE 20 MG/1
20 TABLET ORAL DAILY
Qty: 90 TAB | Refills: 1 | Status: SHIPPED | OUTPATIENT
Start: 2019-12-27 | End: 2020-03-10 | Stop reason: SDUPTHER

## 2019-12-27 RX ORDER — BUPROPION HYDROCHLORIDE 75 MG/1
75 TABLET ORAL 2 TIMES DAILY
Qty: 60 TAB | Refills: 2 | Status: SHIPPED | OUTPATIENT
Start: 2019-12-27 | End: 2020-02-27 | Stop reason: SDUPTHER

## 2019-12-27 RX ORDER — TOPIRAMATE 50 MG/1
50 TABLET, FILM COATED ORAL 2 TIMES DAILY
Qty: 180 TAB | Refills: 1 | Status: SHIPPED | OUTPATIENT
Start: 2019-12-27 | End: 2020-09-24 | Stop reason: SDUPTHER

## 2019-12-27 RX ORDER — ALPRAZOLAM 0.5 MG/1
0.5 TABLET ORAL
Qty: 15 TAB | Refills: 0 | Status: SHIPPED | OUTPATIENT
Start: 2019-12-27 | End: 2020-09-28 | Stop reason: SDUPTHER

## 2019-12-27 NOTE — PROGRESS NOTES
[facility]   [address], [address] [facility]  P: [phone]  F: [phone]      Chief Complaint   Patient presents with    Follow-up    Medication Refill       SUBJECTIVE   Ember Luther is a 30 y.o. male who presents to clinic for Follow-up and Medication Refill. HPI:    Donna Kumar is a 25-year-old male who presents today for medication refill for ADD, migraines, and anxiety and depression. He continues on Wellbutrin 150 mg, Lexapro 20 mg, and Topamax 50 mg tab twice daily. He notes today overall his mood is stable, however he notes inattention at work and is requesting to resume Adderall. He has previously taken 10 mg twice daily on an as-needed basis. He notes he will be applying to new job soon and the medication will be quite helpful for the interview and job application process. He is also requesting a refill of Xanax, which he states he is only required twice in the last month for panic attacks. He was previously evaluated by Dr. Heredia Staff with neuropsych and diagnosed with attention deficit disorder, mild to moderate, depressionmild, and adjustment disorder with anxious/stressed featuresmild. 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 No pertinent surgical history.   Social History     Socioeconomic History    Marital status: SINGLE     Spouse name: Not on file    Number of children: Not on file    Years of education: Not on file    Highest education level: Not on file   Occupational History    Not on file   Social Needs    Financial resource strain: Not on file    Food insecurity: Worry: Not on file     Inability: Not on file    Transportation needs:     Medical: Not on file     Non-medical: Not on file   Tobacco Use    Smoking status: Never Smoker    Smokeless tobacco: Never Used   Substance and Sexual Activity    Alcohol use: Yes     Alcohol/week: 6.0 standard drinks     Types: 6 Cans of beer per week    Drug use: No    Sexual activity: Yes     Partners: Female   Lifestyle    Physical activity:     Days per week: Not on file     Minutes per session: Not on file    Stress: Not on file   Relationships    Social connections:     Talks on phone: Not on file     Gets together: Not on file     Attends Pentecostal service: Not on file     Active member of club or organization: Not on file     Attends meetings of clubs or organizations: Not on file     Relationship status: Not on file    Intimate partner violence:     Fear of current or ex partner: Not on file     Emotionally abused: Not on file     Physically abused: Not on file     Forced sexual activity: Not on file   Other Topics Concern    Not on file   Social History Narrative    Not on file     Family History   Problem Relation Age of Onset    Diabetes Mother     No Known Problems Father     Diabetes Maternal Grandmother     Cancer Maternal Grandfather      Allergies   Allergen Reactions    Shrimp Anaphylaxis       Current Outpatient Medications   Medication Sig Dispense Refill    albuterol (PROVENTIL HFA, VENTOLIN HFA, PROAIR HFA) 90 mcg/actuation inhaler Take 1 Puff by inhalation every four (4) hours as needed for Wheezing or Shortness of Breath. 1 Inhaler 0    ALPRAZolam (XANAX) 0.5 mg tablet Take 1 Tab by mouth three (3) times daily as needed for Anxiety. Max Daily Amount: 1.5 mg. 15 Tab 0    buPROPion (WELLBUTRIN) 75 mg tablet Take 1 Tab by mouth two (2) times a day. Take 2 tabs 60 Tab 2    dextroamphetamine-amphetamine (ADDERALL) 10 mg tablet Take 1 Tab by mouth two (2) times a day.  Max Daily Amount: 20 mg. 60 Tab 0  escitalopram oxalate (LEXAPRO) 20 mg tablet Take 1 Tab by mouth daily. Indications: major depressive disorder 90 Tab 1    topiramate (TOPAMAX) 50 mg tablet Take 1 Tab by mouth two (2) times a day. 180 Tab 1    ondansetron hcl (ZOFRAN) 4 mg tablet Take 1 Tab by mouth every eight (8) hours as needed for Nausea. 20 Tab 0    SUMAtriptan (IMITREX) 50 mg tablet Take 1 Tab by mouth once as needed for Migraine for up to 1 dose. 30 Tab 3    fluticasone (FLONASE) 50 mcg/actuation nasal spray 2 Sprays by Both Nostrils route daily. 1 Bottle 0           The medications were reviewed and updated in the medical record. The past medical history, past surgical history, and family history were reviewed and updated in the medical record. REVIEW OF SYSTEMS   Review of Systems   Constitutional: Negative for malaise/fatigue. HENT: Negative for congestion. Eyes: Negative for blurred vision and pain. Respiratory: Negative for cough and shortness of breath. Cardiovascular: Negative for chest pain and palpitations. Gastrointestinal: Negative for abdominal pain and heartburn. Genitourinary: Negative for frequency and urgency. Musculoskeletal: Negative for joint pain and myalgias. Neurological: Negative for dizziness, tingling, sensory change, weakness and headaches. Psychiatric/Behavioral: Positive for depression. Negative for memory loss and substance abuse. The patient is nervous/anxious. PHYSICAL EXAM     Visit Vitals  /74 (BP 1 Location: Right arm, BP Patient Position: Sitting)   Pulse 91   Temp 98.4 °F (36.9 °C) (Oral)   Resp 18   Ht 5' 10\" (1.778 m)   Wt 264 lb (119.7 kg)   SpO2 96%   BMI 37.88 kg/m²       Physical Exam  Vitals signs and nursing note reviewed. Constitutional:       Appearance: He is obese. HENT:      Head: Normocephalic and atraumatic.       Right Ear: External ear normal.      Left Ear: External ear normal.   Cardiovascular:      Rate and Rhythm: Normal rate and regular rhythm. Heart sounds: Normal heart sounds. Pulmonary:      Effort: Pulmonary effort is normal.      Breath sounds: Normal breath sounds. Musculoskeletal: Normal range of motion. Skin:     General: Skin is warm and dry. Neurological:      Mental Status: He is alert and oriented to person, place, and time. Gait: Gait is intact. Psychiatric:         Mood and Affect: Affect is flat. Thought Content: Thought content does not include homicidal or suicidal ideation. Thought content does not include homicidal or suicidal plan. Judgment: Judgment normal.       ASSESSMENT/ PLAN   Diagnoses and all orders for this visit:    1. Attention deficit disorder (ADD) without hyperactivity  -     dextroamphetamine-amphetamine (ADDERALL) 10 mg tablet; Take 1 Tab by mouth two (2) times a day. Max Daily Amount: 20 mg.    2. Depression, unspecified depression type  -     buPROPion (WELLBUTRIN) 75 mg tablet; Take 1 Tab by mouth two (2) times a day. Take 2 tabs  -     escitalopram oxalate (LEXAPRO) 20 mg tablet; Take 1 Tab by mouth daily. Indications: major depressive disorder    3. Panic attacks  -     ALPRAZolam (XANAX) 0.5 mg tablet; Take 1 Tab by mouth three (3) times daily as needed for Anxiety. Max Daily Amount: 1.5 mg.    4. Migraine without aura and without status migrainosus, not intractable  -     topiramate (TOPAMAX) 50 mg tablet; Take 1 Tab by mouth two (2) times a day. 5. Wheezing  -     albuterol (PROVENTIL HFA, VENTOLIN HFA, PROAIR HFA) 90 mcg/actuation inhaler; Take 1 Puff by inhalation every four (4) hours as needed for Wheezing or Shortness of Breath. Disclaimer:  Advised patient to call back or return to office if symptoms worsen/change/persist.  Discussed expected course/resolution/complications of diagnosis in detail with patient.     Medication risks/benefits/alternatives discussed with patient.   Patient was given an after visit summary which includes diagnoses, current medications, & vitals.      Discussed patient instructions and advised to read to all patient instructions regarding care.      Patient expressed understanding with the diagnosis and plan. This note will not be viewable in [address]         Thelma Fernandez NP  12/27/2019        (This document has been electronically signed)

## 2019-12-27 NOTE — PROGRESS NOTES
All health maintenance and other pertinent information has been reviewed in preparation for today's office visit. Patient presents in the office today for:ov    No chief complaint on file. 1. Have you been to the ER, urgent care clinic since your last visit? Hospitalized since your last visit? No    2. Have you seen or consulted any other health care providers outside of the [address] [place] [address] since your last visit? Include any pap smears or colon screening.  No

## 2020-02-27 DIAGNOSIS — F32.A DEPRESSION, UNSPECIFIED DEPRESSION TYPE: ICD-10-CM

## 2020-02-27 RX ORDER — BUPROPION HYDROCHLORIDE 75 MG/1
75 TABLET ORAL 2 TIMES DAILY
Qty: 180 TAB | Refills: 1 | Status: SHIPPED | OUTPATIENT
Start: 2020-02-27 | End: 2020-06-11 | Stop reason: SDUPTHER

## 2020-03-10 DIAGNOSIS — F32.A DEPRESSION, UNSPECIFIED DEPRESSION TYPE: ICD-10-CM

## 2020-03-10 RX ORDER — ESCITALOPRAM OXALATE 20 MG/1
20 TABLET ORAL DAILY
Qty: 90 TAB | Refills: 0 | Status: SHIPPED | OUTPATIENT
Start: 2020-03-10 | End: 2020-07-20 | Stop reason: SDUPTHER

## 2020-06-11 ENCOUNTER — TELEPHONE (OUTPATIENT)
Dept: PRIMARY CARE CLINIC | Age: 25
End: 2020-06-11

## 2020-06-11 DIAGNOSIS — F32.A DEPRESSION, UNSPECIFIED DEPRESSION TYPE: ICD-10-CM

## 2020-06-11 RX ORDER — BUPROPION HYDROCHLORIDE 75 MG/1
75 TABLET ORAL 2 TIMES DAILY
Qty: 180 TAB | Refills: 1 | Status: SHIPPED | OUTPATIENT
Start: 2020-06-11 | End: 2020-09-24 | Stop reason: DRUGHIGH

## 2020-06-11 RX ORDER — BUPROPION HYDROCHLORIDE 75 MG/1
75 TABLET ORAL 2 TIMES DAILY
Qty: 180 TAB | Refills: 1 | Status: SHIPPED | OUTPATIENT
Start: 2020-06-11 | End: 2020-06-11 | Stop reason: SDUPTHER

## 2020-07-20 DIAGNOSIS — F32.A DEPRESSION, UNSPECIFIED DEPRESSION TYPE: ICD-10-CM

## 2020-07-20 RX ORDER — ESCITALOPRAM OXALATE 20 MG/1
20 TABLET ORAL DAILY
Qty: 90 TAB | Refills: 1 | Status: SHIPPED | OUTPATIENT
Start: 2020-07-20 | End: 2020-08-14 | Stop reason: ALTCHOICE

## 2020-08-14 ENCOUNTER — VIRTUAL VISIT (OUTPATIENT)
Dept: PRIMARY CARE CLINIC | Age: 25
End: 2020-08-14

## 2020-08-14 DIAGNOSIS — F32.A DEPRESSION, UNSPECIFIED DEPRESSION TYPE: Primary | ICD-10-CM

## 2020-08-14 PROCEDURE — 99213 OFFICE O/P EST LOW 20 MIN: CPT | Performed by: NURSE PRACTITIONER

## 2020-08-14 RX ORDER — PAROXETINE HYDROCHLORIDE 20 MG/1
20 TABLET, FILM COATED ORAL DAILY
Qty: 90 TAB | Refills: 0 | Status: SHIPPED | OUTPATIENT
Start: 2020-08-14

## 2020-08-17 NOTE — PROGRESS NOTES
[facility]   [address], [address] [facility]  P: [phone]  F: [address] [facility] is a 30 y.o. male who is seen over telehealth for Depression and Medication Evaluation. He reports Lexapro 20 mg is working well for him, however he reports feelings of apathy and lack of interest in things that he enjoys doing. He presents today to discuss changing his Lexapro. He does report that his dad takes Paxil and is doing well on that. Denies SI or HI, denies current counseling. 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 Social History     Socioeconomic History    Marital status: SINGLE     Spouse name: Not on file    Number of children: Not on file    Years of education: Not on file    Highest education level: Not on file   Occupational History    Not on file   Social Needs    Financial resource strain: Not on file    Food insecurity     Worry: Not on file     Inability: Not on file    Transportation needs     Medical: Not on file     Non-medical: Not on file   Tobacco Use    Smoking status: Never Smoker    Smokeless tobacco: Never Used   Substance and Sexual Activity    Alcohol use:  Yes     Alcohol/week: 6.0 standard drinks     Types: 6 Cans of beer per week    Drug use: No    Sexual activity: Yes     Partners: Female   Lifestyle    Physical activity     Days per week: Not on file     Minutes per session: Not on file    Stress: Not on file   Relationships    Social connections     Talks on phone: Not on file     Gets together: Not on file     Attends Rastafari service: Not on file     Active member of club or organization: Not on file     Attends meetings of clubs or organizations: Not on file     Relationship status: Not on file    Intimate partner violence     Fear of current or ex partner: Not on file     Emotionally abused: Not on file     Physically abused: Not on file     Forced sexual activity: Not on file   Other Topics Concern    Not on file   Social History Narrative    Not on file     Family History   Problem Relation Age of Onset    Diabetes Mother     No Known Problems Father     Diabetes Maternal Grandmother     Cancer Maternal Grandfather      Allergies   Allergen Reactions    Shrimp Anaphylaxis       Current Outpatient Medications   Medication Sig Dispense Refill    PARoxetine (PAXIL) 20 mg tablet Take 1 Tab by mouth daily. 90 Tab 0    buPROPion (WELLBUTRIN) 75 mg tablet Take 1 Tab by mouth two (2) times a day. Take 2 tabs 180 Tab 1    albuterol (PROVENTIL HFA, VENTOLIN HFA, PROAIR HFA) 90 mcg/actuation inhaler Take 1 Puff by inhalation every four (4) hours as needed for Wheezing or Shortness of Breath. 1 Inhaler 0    ALPRAZolam (XANAX) 0.5 mg tablet Take 1 Tab by mouth three (3) times daily as needed for Anxiety. Max Daily Amount: 1.5 mg. 15 Tab 0    dextroamphetamine-amphetamine (ADDERALL) 10 mg tablet Take 1 Tab by mouth two (2) times a day. Max Daily Amount: 20 mg. 60 Tab 0    topiramate (TOPAMAX) 50 mg tablet Take 1 Tab by mouth two (2) times a day. 180 Tab 1    ondansetron hcl (ZOFRAN) 4 mg tablet Take 1 Tab by mouth every eight (8) hours as needed for Nausea. 20 Tab 0    SUMAtriptan (IMITREX) 50 mg tablet Take 1 Tab by mouth once as needed for Migraine for up to 1 dose. 30 Tab 3    fluticasone (FLONASE) 50 mcg/actuation nasal spray 2 Sprays by Both Nostrils route daily. 1 Bottle 0           The medications were reviewed and updated in the medical record.   The past medical history, past surgical history, and family history were reviewed and updated in the medical record. REVIEW OF SYSTEMS   Review of Systems   Constitutional: Negative for malaise/fatigue. HENT: Negative for congestion. Eyes: Negative for blurred vision and pain. Respiratory: Negative for cough and shortness of breath. Cardiovascular: Negative for chest pain and palpitations. Gastrointestinal: Negative for abdominal pain and heartburn. Genitourinary: Negative for frequency and urgency. Musculoskeletal: Negative for joint pain and myalgias. Neurological: Negative for dizziness, tingling, sensory change, weakness and headaches. Psychiatric/Behavioral: Negative for depression, memory loss and substance abuse. PHYSICAL EXAM   NO VITALS WERE TAKEN FOR THIS VISIT  Physical Exam  Vitals signs and nursing note reviewed. Constitutional:       Appearance: Normal appearance. HENT:      Head: Normocephalic and atraumatic. Right Ear: External ear normal.      Left Ear: External ear normal.   Pulmonary:      Effort: Pulmonary effort is normal.   Musculoskeletal: Normal range of motion. Skin:     General: Skin is warm and dry. Neurological:      Mental Status: He is alert and oriented to person, place, and time. Mental status is at baseline. Gait: Gait is intact. Psychiatric:         Mood and Affect: Affect normal.         Speech: Speech normal.         Thought Content: Thought content normal.         Judgment: Judgment normal.       ASSESSMENT/ PLAN   Diagnoses and all orders for this visit:    1. Depression, unspecified depression type  -     PARoxetine (PAXIL) 20 mg tablet; Take 1 Tab by mouth daily.  -Discussed since he feels that the Lexapro dosing is too much, we discussed weaning off of Lexapro over the next 2 weeks. Start Paxil in 1 week. Encouraged counseling.     -I've explained to him that drugs of the SSRI class can have side effects such as weight gain, sexual dysfunction, insomnia, headache, nausea.  These medications are generally effective at alleviating symptoms of anxiety and/or depression. Let me know if significant side effects do occur. I was in the office while conducting this encounter. Consent:  He and/or his healthcare decision maker is aware that this patient-initiated Telehealth encounter is a billable service, with coverage as determined by his insurance carrier. He is aware that he may receive a bill and has provided verbal consent to proceed: Yes    This virtual visit was conducted via [employer]. Pursuant to the emergency declaration under the [facility], [facility] waiver authority and the [employer] Act, this Virtual  Visit was conducted to reduce the patient's risk of exposure to COVID-19 and provide continuity of care for an established patient. Services were provided through a video synchronous discussion virtually to substitute for in-person clinic visit. Due to this being a TeleHealth evaluation, many elements of the physical examination are unable to be assessed. Total Time: minutes: 11-20 minutes. Disclaimer:  Advised patient to call back or return to office if symptoms worsen/change/persist.  Discussed expected course/resolution/complications of diagnosis in detail with patient. Medication risks/benefits/alternatives discussed with patient. Patient was given an after visit summary which includes diagnoses, current medications, & vitals. Discussed patient instructions and advised to read to all patient instructions regarding care. Patient expressed understanding with the diagnosis and plan. This note will not be viewable in [address]         Laquita Urias NP  8/14/2020        (This document has been electronically signed)

## 2020-09-24 DIAGNOSIS — G43.009 MIGRAINE WITHOUT AURA AND WITHOUT STATUS MIGRAINOSUS, NOT INTRACTABLE: ICD-10-CM

## 2020-09-24 DIAGNOSIS — F32.A DEPRESSION, UNSPECIFIED DEPRESSION TYPE: Primary | ICD-10-CM

## 2020-09-24 RX ORDER — TOPIRAMATE 50 MG/1
50 TABLET, FILM COATED ORAL 2 TIMES DAILY
Qty: 180 TAB | Refills: 1 | Status: SHIPPED | OUTPATIENT
Start: 2020-09-24

## 2020-09-24 RX ORDER — BUPROPION HYDROCHLORIDE 150 MG/1
150 TABLET ORAL
Qty: 60 TAB | Refills: 2 | Status: SHIPPED | OUTPATIENT
Start: 2020-09-24

## 2020-09-28 DIAGNOSIS — F41.0 PANIC ATTACKS: ICD-10-CM

## 2020-09-28 RX ORDER — ALPRAZOLAM 0.5 MG/1
0.5 TABLET ORAL
Qty: 15 TAB | Refills: 0 | Status: SHIPPED | OUTPATIENT
Start: 2020-09-28